# Patient Record
Sex: FEMALE | Race: WHITE | NOT HISPANIC OR LATINO | Employment: OTHER | URBAN - METROPOLITAN AREA
[De-identification: names, ages, dates, MRNs, and addresses within clinical notes are randomized per-mention and may not be internally consistent; named-entity substitution may affect disease eponyms.]

---

## 2022-10-24 ENCOUNTER — TELEPHONE (OUTPATIENT)
Dept: OBSTETRICS AND GYNECOLOGY | Facility: CLINIC | Age: 47
End: 2022-10-24
Payer: COMMERCIAL

## 2022-10-24 DIAGNOSIS — R68.82 DECREASED LIBIDO: ICD-10-CM

## 2022-10-24 DIAGNOSIS — R53.83 FATIGUE, UNSPECIFIED TYPE: Primary | ICD-10-CM

## 2022-10-24 NOTE — TELEPHONE ENCOUNTER
----- Message from Allyn Ann sent at 10/24/2022  9:19 AM CDT -----  Regarding: Pellets  Contact: patient  Per phone call with patient,she stated that she would like to schedule an appointment for Pellets.  Please return call at [phone] (home).    Thanks,  SJ

## 2022-10-24 NOTE — TELEPHONE ENCOUNTER
Called and spoke with the patient in regards to her message. Pt reports doing hormone pellets 1x in [place] about a year ago. She has had a Rt Ooph then hyst with BS. She is experiencing fatigue and decreased libido. I adv patient that Valeria would like for her to have some lab work before ordering the pellets. Will go ahead and schedule insertion for Nov 11th at 3:30. Pt is aware and will go tomorrow to have her labs drawn.   Zara Bustamante

## 2022-10-26 LAB
CHOLEST SERPL-MSCNC: 194 MG/DL (ref 100–200)
FREE TESTOSTERONE: 0.2 NG/DL (ref 0–1)
FSH: 43.7 MIU/ML
HDLC SERPL-MCNC: 65 MG/DL
LDL/HDL RATIO: 1.7 (ref 1–3)
LDLC SERPL CALC-MCNC: 112.6 MG/DL (ref 0–100)
TESTOST SERPL-MCNC: 22.4 NG/DL (ref 8.4–48.1)
TRIGL SERPL-MCNC: 82 MG/DL (ref 0–150)

## 2022-11-15 ENCOUNTER — PROCEDURE VISIT (OUTPATIENT)
Dept: OBSTETRICS AND GYNECOLOGY | Facility: CLINIC | Age: 47
End: 2022-11-15

## 2022-11-15 VITALS
WEIGHT: 172 LBS | DIASTOLIC BLOOD PRESSURE: 83 MMHG | SYSTOLIC BLOOD PRESSURE: 133 MMHG | HEIGHT: 65 IN | BODY MASS INDEX: 28.66 KG/M2 | HEART RATE: 84 BPM

## 2022-11-15 DIAGNOSIS — R53.83 FATIGUE, UNSPECIFIED TYPE: ICD-10-CM

## 2022-11-15 DIAGNOSIS — N95.1 MENOPAUSE SYNDROME: Primary | ICD-10-CM

## 2022-11-15 PROCEDURE — 11980 IMPLANT HORMONE PELLET(S): CPT | Mod: S$GLB,,, | Performed by: NURSE PRACTITIONER

## 2022-11-15 PROCEDURE — 11980 PR IMPLANT,HORMONE,SUBCUTANEOUS: ICD-10-PCS | Mod: S$GLB,,, | Performed by: NURSE PRACTITIONER

## 2022-11-15 NOTE — PROCEDURES
Procedures  Discussed with the patient the risks and benefit of HRT and pellet insertion. Consents signed by the patient and allergies were verified. Time out performed. All questions were answered. The area on the hip was prepped with betadine prep and a sterile drape was applied. 1% lidocaine with epi (5ml) was injected and a small transverse incision was made using an 11 blade. The trocar with cannula was passed through the incision into the subcutaneous tissue and the testosterone and/or estrogen pellets were inserted. The dosing of the pellets are as follows: testosterone 100 mg lot # [id number] exp 12/9/22 and testosterone 25 mg lot # [id number] exp 12/11/22. Bleeding was minimal and a steri strip was applied with a sterile dressing applied on top. The patient tolerated the procedure well and was educated on post insertion instructions.

## 2022-11-15 NOTE — PATIENT INSTRUCTIONS
MONITOR FOR SIGNS AND SYMPTOMS OF INFECTION SUCH AS REDNESS, SWELLING, PUS, INCREASED PAIN OR FEVER. PLEASE CALL THE OFFICE IF YOU EXPERIENCE ANY OF THESE SYMPTOMS. PLEASE REMOVE THE OUTER BANDAGE TOMORROW. PLEASE REMOVE THE STERI STRIP IN THREE DAYS. REFRAIN FROM LOWER BODY EXERCISES FOR THREE DAYS BUT YOU MAY PARTICIPATE IN UPPER BODY EXERCISES OR SLOWER WALKING. IF IT IS TIME FOR BLOOD WORK PLEASE GO IN 6 WEEKS AS ADVISED.     Left side done. Labs ordered, poss add in est if she has s/s

## 2023-01-03 ENCOUNTER — TELEPHONE (OUTPATIENT)
Dept: OBSTETRICS AND GYNECOLOGY | Facility: CLINIC | Age: 48
End: 2023-01-03
Payer: COMMERCIAL

## 2023-01-03 ENCOUNTER — PATIENT MESSAGE (OUTPATIENT)
Dept: OBSTETRICS AND GYNECOLOGY | Facility: CLINIC | Age: 48
End: 2023-01-03
Payer: COMMERCIAL

## 2023-01-03 NOTE — TELEPHONE ENCOUNTER
----- Message from Savita Ryan sent at 1/3/2023  8:53 AM CST -----  Contact: self  Pt called and asked for a call back regarding labs she suppose to have done. Pt can be reached at   [phone]

## 2023-01-04 DIAGNOSIS — Z12.31 SCREENING MAMMOGRAM FOR BREAST CANCER: Primary | ICD-10-CM

## 2023-01-09 ENCOUNTER — PATIENT MESSAGE (OUTPATIENT)
Dept: OBSTETRICS AND GYNECOLOGY | Facility: CLINIC | Age: 48
End: 2023-01-09
Payer: COMMERCIAL

## 2023-01-09 LAB
FREE TESTOSTERONE: 4.09 NG/DL (ref 0–1)
FSH: 4.36 MIU/ML
TESTOST SERPL-MCNC: 297 NG/DL (ref 8.4–48.1)

## 2023-01-18 ENCOUNTER — OFFICE VISIT (OUTPATIENT)
Dept: OBSTETRICS AND GYNECOLOGY | Facility: CLINIC | Age: 48
End: 2023-01-18
Payer: COMMERCIAL

## 2023-01-18 VITALS
HEART RATE: 94 BPM | WEIGHT: 168.19 LBS | DIASTOLIC BLOOD PRESSURE: 80 MMHG | BODY MASS INDEX: 27.99 KG/M2 | SYSTOLIC BLOOD PRESSURE: 116 MMHG

## 2023-01-18 DIAGNOSIS — N62 LARGE BREASTS: ICD-10-CM

## 2023-01-18 DIAGNOSIS — Z01.419 GYNECOLOGIC EXAM NORMAL: Primary | ICD-10-CM

## 2023-01-18 PROCEDURE — 99396 PREV VISIT EST AGE 40-64: CPT | Mod: S$GLB,,, | Performed by: NURSE PRACTITIONER

## 2023-01-18 PROCEDURE — 3079F PR MOST RECENT DIASTOLIC BLOOD PRESSURE 80-89 MM HG: ICD-10-PCS | Mod: CPTII,S$GLB,, | Performed by: NURSE PRACTITIONER

## 2023-01-18 PROCEDURE — 1159F PR MEDICATION LIST DOCUMENTED IN MEDICAL RECORD: ICD-10-PCS | Mod: CPTII,S$GLB,, | Performed by: NURSE PRACTITIONER

## 2023-01-18 PROCEDURE — 3008F BODY MASS INDEX DOCD: CPT | Mod: CPTII,S$GLB,, | Performed by: NURSE PRACTITIONER

## 2023-01-18 PROCEDURE — 3079F DIAST BP 80-89 MM HG: CPT | Mod: CPTII,S$GLB,, | Performed by: NURSE PRACTITIONER

## 2023-01-18 PROCEDURE — 1160F RVW MEDS BY RX/DR IN RCRD: CPT | Mod: CPTII,S$GLB,, | Performed by: NURSE PRACTITIONER

## 2023-01-18 PROCEDURE — 1160F PR REVIEW ALL MEDS BY PRESCRIBER/CLIN PHARMACIST DOCUMENTED: ICD-10-PCS | Mod: CPTII,S$GLB,, | Performed by: NURSE PRACTITIONER

## 2023-01-18 PROCEDURE — 3074F SYST BP LT 130 MM HG: CPT | Mod: CPTII,S$GLB,, | Performed by: NURSE PRACTITIONER

## 2023-01-18 PROCEDURE — 3008F PR BODY MASS INDEX (BMI) DOCUMENTED: ICD-10-PCS | Mod: CPTII,S$GLB,, | Performed by: NURSE PRACTITIONER

## 2023-01-18 PROCEDURE — 1159F MED LIST DOCD IN RCRD: CPT | Mod: CPTII,S$GLB,, | Performed by: NURSE PRACTITIONER

## 2023-01-18 PROCEDURE — 99396 PR PREVENTIVE VISIT,EST,40-64: ICD-10-PCS | Mod: S$GLB,,, | Performed by: NURSE PRACTITIONER

## 2023-01-18 PROCEDURE — 3074F PR MOST RECENT SYSTOLIC BLOOD PRESSURE < 130 MM HG: ICD-10-PCS | Mod: CPTII,S$GLB,, | Performed by: NURSE PRACTITIONER

## 2023-01-18 NOTE — PROGRESS NOTES
Subjective:       Patient ID: Layla Matias is a 47 y.o. female.    Chief Complaint:  Well Woman      History of Present Illness  HPI  Annual Exam-Postmenopausal  Patient presents for annual exam. The patient has no complaints today. The patient is sexually active. GYN screening history: last pap: was normal and last mammogram: was normal. The patient is taking hormone replacement therapy. Patient denies post-menopausal vaginal bleeding. The patient wears seatbelts: yes.     No outpatient medications have been marked as taking for the 23 encounter (Office Visit) with Valeria Fuentes NP.     Vitals:    23 1324   BP: 116/80   Pulse: 94   Weight: 76.3 kg (168 lb 3.2 oz)     History reviewed. No pertinent past medical history.  Past Surgical History:   Procedure Laterality Date     SECTION      x3    RIGHT OOPHORECTOMY      SALPINGECTOMY Bilateral     TOTAL ABDOMINAL HYSTERECTOMY      With BS       GYN & OB History  No LMP recorded. Patient has had a hysterectomy.   Date of Last Pap: No result found    OB History    Para Term  AB Living   3 3           SAB IAB Ectopic Multiple Live Births                  # Outcome Date GA Lbr Surjit/2nd Weight Sex Delivery Anes PTL Lv   3 Para      CS-Unspec      2 Para      CS-Unspec      1 Para      CS-Unspec          Review of Systems  Review of Systems   Constitutional:  Negative for activity change, appetite change, chills, fatigue and fever.   HENT:  Negative for nasal congestion and tinnitus.    Eyes:  Negative for visual disturbance.   Respiratory:  Negative for cough and shortness of breath.    Cardiovascular:  Negative for chest pain and palpitations.   Gastrointestinal:  Negative for abdominal pain, bloating, blood in stool, constipation, nausea and vomiting.   Endocrine: Negative for diabetes, hair loss and hot flashes.   Genitourinary:  Negative for bladder incontinence, decreased libido, dysmenorrhea, dyspareunia, dysuria, flank pain,  frequency, genital sores, hematuria, hot flashes, menorrhagia, menstrual problem, pelvic pain, urgency, vaginal bleeding, vaginal discharge, vaginal pain, urinary incontinence, postcoital bleeding, postmenopausal bleeding, vaginal dryness and vaginal odor.   Musculoskeletal:  Negative for arthralgias, back pain, leg pain and myalgias.   Integumentary:  Negative for rash, acne, hair changes, mole/lesion, breast mass, nipple discharge, breast skin changes and breast tenderness.   Neurological:  Negative for vertigo, syncope, numbness and headaches.   Hematological:  Does not bruise/bleed easily.   Psychiatric/Behavioral:  Negative for depression and sleep disturbance. The patient is not nervous/anxious.    Breast: Negative for asymmetry, lump, mass, mastodynia, nipple discharge, skin changes and tenderness        Objective:    Physical Exam:   Constitutional: Vital signs are normal. She appears well-developed and well-nourished.    HENT:   Head: Normocephalic.   Nose: No epistaxis.    Eyes: Lids are normal.    Neck: Trachea normal.    Cardiovascular:  Normal rate, regular rhythm and normal heart sounds.             Pulmonary/Chest: Effort normal and breath sounds normal. Right breast exhibits no mass, no skin change, no tenderness and no swelling. Left breast exhibits no mass, no skin change, no tenderness and no swelling. Breasts are symmetrical.          Genitourinary:    Vagina, uterus and rectum normal.      Pelvic exam was performed with patient supine.   Labial bartholins normal.Cervix is normal. Right adnexum displays no mass and no tenderness. Left adnexum displays no mass and no tenderness. No erythema or  no vaginal discharge in the vagina.              Lymphadenopathy:     She has no cervical adenopathy.      Psychiatric: She has a normal mood and affect. Her speech is normal and behavior is normal. Judgment and thought content normal.        Assessment:        1. Gynecologic exam normal  Liquid-based pap  smear, screening      2. Large breasts  Ambulatory referral/consult to General Surgery                   Plan:         Gynecologic exam normal  -     Liquid-based pap smear, screening    Large breasts  -     Ambulatory referral/consult to General Surgery; Future; Expected date: 01/25/2023      Patient was counseled today on current ASCCP pap guidelines, the recommendation for yearly pelvic exams, healthy diet and exercise routines, annual mammograms starting at age 40, and breast self awareness. She is to see her PCP for other health maintenance.     Follow up one year or as needed

## 2023-01-23 LAB — Lab: NORMAL

## 2023-01-24 ENCOUNTER — PATIENT MESSAGE (OUTPATIENT)
Dept: OBSTETRICS AND GYNECOLOGY | Facility: CLINIC | Age: 48
End: 2023-01-24
Payer: COMMERCIAL

## 2023-01-25 ENCOUNTER — PATIENT MESSAGE (OUTPATIENT)
Dept: OBSTETRICS AND GYNECOLOGY | Facility: CLINIC | Age: 48
End: 2023-01-25
Payer: COMMERCIAL

## 2023-02-08 ENCOUNTER — OFFICE VISIT (OUTPATIENT)
Dept: PLASTIC SURGERY | Facility: CLINIC | Age: 48
End: 2023-02-08
Payer: COMMERCIAL

## 2023-02-08 VITALS
HEART RATE: 91 BPM | OXYGEN SATURATION: 97 % | HEIGHT: 65 IN | WEIGHT: 162 LBS | SYSTOLIC BLOOD PRESSURE: 144 MMHG | DIASTOLIC BLOOD PRESSURE: 82 MMHG | BODY MASS INDEX: 26.99 KG/M2

## 2023-02-08 DIAGNOSIS — N62 LARGE BREASTS: Primary | ICD-10-CM

## 2023-02-08 PROCEDURE — 1159F MED LIST DOCD IN RCRD: CPT | Mod: CPTII,S$GLB,, | Performed by: SURGERY

## 2023-02-08 PROCEDURE — 3079F PR MOST RECENT DIASTOLIC BLOOD PRESSURE 80-89 MM HG: ICD-10-PCS | Mod: CPTII,S$GLB,, | Performed by: SURGERY

## 2023-02-08 PROCEDURE — 99204 OFFICE O/P NEW MOD 45 MIN: CPT | Mod: S$GLB,,, | Performed by: SURGERY

## 2023-02-08 PROCEDURE — 3077F PR MOST RECENT SYSTOLIC BLOOD PRESSURE >= 140 MM HG: ICD-10-PCS | Mod: CPTII,S$GLB,, | Performed by: SURGERY

## 2023-02-08 PROCEDURE — 3008F BODY MASS INDEX DOCD: CPT | Mod: CPTII,S$GLB,, | Performed by: SURGERY

## 2023-02-08 PROCEDURE — 3008F PR BODY MASS INDEX (BMI) DOCUMENTED: ICD-10-PCS | Mod: CPTII,S$GLB,, | Performed by: SURGERY

## 2023-02-08 PROCEDURE — 3077F SYST BP >= 140 MM HG: CPT | Mod: CPTII,S$GLB,, | Performed by: SURGERY

## 2023-02-08 PROCEDURE — 3079F DIAST BP 80-89 MM HG: CPT | Mod: CPTII,S$GLB,, | Performed by: SURGERY

## 2023-02-08 PROCEDURE — 1159F PR MEDICATION LIST DOCUMENTED IN MEDICAL RECORD: ICD-10-PCS | Mod: CPTII,S$GLB,, | Performed by: SURGERY

## 2023-02-08 PROCEDURE — 99204 PR OFFICE/OUTPT VISIT, NEW, LEVL IV, 45-59 MIN: ICD-10-PCS | Mod: S$GLB,,, | Performed by: SURGERY

## 2023-02-08 RX ORDER — FERROUS SULFATE, DRIED 160(50) MG
1 TABLET, EXTENDED RELEASE ORAL 2 TIMES DAILY WITH MEALS
COMMUNITY

## 2023-02-08 RX ORDER — ERGOCALCIFEROL 1.25 MG/1
CAPSULE ORAL
COMMUNITY
End: 2023-11-28

## 2023-02-08 NOTE — PROGRESS NOTES
"      REFERRAL FOR BREAST REDUCTION    Chief Complaint  Large breasts    Referring provider:  Valeria EGAN  Layla Matias is a 47 y.o. female.  She currently wears a F cup bra and prefers to be smaller.   She has attempted to mitigate symptoms with massagechiropracter done in [place] analgesics, and wearing extra bras.  Her weight has been stable  for 6 months.     She denies breast mass or skin change.   Personal hx of breast biopsy: none  Personalfamily history of breastovarian cancer: mother had ovarian cancer  Mammo uptodate Birads 2 done 2022    She reports associated complaints of:    Restricted physical activity, leaning over to do housework causes pain   Feeling "hunched over" even while sitting  Chronic breast pain secondary to weight  Shoulder grooving     She works as a grocery delivery .    Tobacco use: none  Marijuana use: none      PMH  Past Medical History:   Diagnosis Date    Back pain          PSH  Past Surgical History:   Procedure Laterality Date     SECTION      x3    RIGHT OOPHORECTOMY      SALPINGECTOMY Bilateral     TOTAL ABDOMINAL HYSTERECTOMY      With BS       OBSTETRIC HISTORY  OB History          3    Para   3    Term                AB        Living             SAB        IAB        Ectopic        Multiple        Live Births                     FH  Family History   Problem Relation Age of Onset    Uterine cancer Mother         ukn age    Breast cancer Neg Hx     Colon cancer Neg Hx     Ovarian cancer Neg Hx     Melanoma Neg Hx     Pancreatic cancer Neg Hx     Prostate cancer Neg Hx        MEDICATIONS  Outpatient Medications as of 2023   Medication Sig Dispense Refill    calcium-vitamin D3 (CALCIUM 500 + D) 500 mg-5 mcg (200 unit) per tablet Take 1 tablet by mouth 2 (two) times daily with meals.       No current facility-administered medications on file as of 2023.        ALLERGIES   Review of patient's allergies indicates:   Allergen Reactions    " "Codeine Other (See Comments)     Severe vomiting    Sulfa (sulfonamide antibiotics) Hives       SOCIAL HISTORY  Tobacco:   Social History     Tobacco Use   Smoking Status Never   Smokeless Tobacco Never     EtOH:   Social History     Substance and Sexual Activity   Alcohol Use Yes    Comment: Occasional         ROS  Review of Systems   Constitutional: Negative for chills, fever and malaise/fatigue.   HENT: Negative for congestion.    Eyes: Negative for blurred vision and double vision.   Respiratory: Negative for cough and sputum production.    Cardiovascular: Negative for chest pain and palpitations.   Gastrointestinal: Negative for nausea and vomiting.   Genitourinary: Negative for dysuria and hematuria.   Musculoskeletal: Negative for joint pain.   Skin: Negative for itching and rash.   Neurological: Negative for dizziness, seizures and headaches.   Psychiatric/Behavioral: Negative for depression. The patient is not nervous/anxious.          PHYSICAL EXAM  BP (!) 144/82   Pulse 91   Ht 5' 5" (1.651 m)   Wt 73.5 kg (162 lb)   SpO2 97%   BMI 26.96 kg/m²   Body mass index is 26.96 kg/m².  Estimated body surface area is 1.84 meters squared as calculated from the following:    Height as of this encounter: 5' 5" (1.651 m).    Weight as of this encounter: 73.5 kg (162 lb).    Constitutional: Pt is oriented to person, place, and time.  Pt appears well-developed and well-nourished.   HENT: Normocephalic and atraumatic.   Pulmonary/Chest: Effort normal. No respiratory distress.   Abdomen: Soft. Non-tender. No masses or distension.  Musculoskeletal: Normal range of motion. Pt exhibits no edema or deformity.   Neurological: Pt is alert and oriented to person, place, and time. No sensory deficit. Exhibits normal muscle tone.   Skin: Skin is warm. No rash noted. No erythema.         BREASTS  Asymmetry in size   Ptosis Grade 3  No Masses or skin change appreciated in either breast.  No lymphadenopathy  No active " Excoriation or skin discoloration inframammary fold or midline  Shoulder grooving present  Areola widened, no nipple discharge elicited. Nipple sensation present.  Lipodystrophy: Axillary roll, lateral chest wall  No Breast scars    Measurements  L SN to N 31  R SN to N 33    L N to IMF 10  R N to IMF 12      PHOTOS                        ASSESSMENT  47 y.o. female with Macromastia, symptomatic > 6 months with sufficient evidence of functional impairments related to breast weight.       Today we discussed indications for surgery and I believe she would greatly benefit from a reduction of 482g in each breast at least. We discussed a superior pedicle and keyhole pattern skin reduction to restore nipple position and achieve desired goal. The location of her scars were demonstrated today. We did discuss that her size would not be guaranteed, however we would ensure that her breasts are proportional to her chest frame.      Preoperative, perioperative and postoperative plans were discussed in detail. Risks of surgery and alternatives to surgery were also discussed today.      She is >35 years of age with no known family history of breast cancer or elevated personal risk. Mammogram report.    She understands that any breast tissue removed during surgery will be weighed and sent to pathology for a thorough evaluation. She understands that this is not a cancer operation and that if atypicalpremalignantor malignant cells are found, further surgery may be warranted after the appropriate consultations with surgical oncology are completed.     She has considered these options and she would like to proceed with breast reduction.   We will provide add on professional fees for her desired fat grafting to shoulder grooving, liposuction of axillary and chest wall lipodystrophy.   We took photos today to obtain pre-authorization for this clinically indicated procedure.  All of her questions were answered today to her satisfaction.      Pt does do pellet replacement last dose 11/2022- rec cessation     Madelyn Son MD FACS

## 2023-03-24 ENCOUNTER — TELEPHONE (OUTPATIENT)
Dept: PLASTIC SURGERY | Facility: CLINIC | Age: 48
End: 2023-03-24
Payer: COMMERCIAL

## 2023-03-24 NOTE — TELEPHONE ENCOUNTER
----- Message from Vianca Kellogg sent at 3/24/2023  3:28 PM CDT -----  Contact: Pt    ----- Message -----  From: Hannah Simeon  Sent: 3/24/2023   1:31 PM CDT  To: [facility] Staff    Calling rg wanting to move her surgery up from the date that she is scheduled and can be reached at [phone]//thanks/dbw

## 2023-03-24 NOTE — TELEPHONE ENCOUNTER
Spoke with patient she will keep her original date. If April 19th opens she would like to be notified. She has not had any pellets since November

## 2023-04-10 ENCOUNTER — CLINICAL SUPPORT (OUTPATIENT)
Dept: PLASTIC SURGERY | Facility: CLINIC | Age: 48
End: 2023-04-10
Payer: COMMERCIAL

## 2023-04-10 DIAGNOSIS — N62 LARGE BREASTS: Primary | ICD-10-CM

## 2023-04-17 ENCOUNTER — OFFICE VISIT (OUTPATIENT)
Dept: PLASTIC SURGERY | Facility: CLINIC | Age: 48
End: 2023-04-17
Payer: COMMERCIAL

## 2023-04-17 VITALS
SYSTOLIC BLOOD PRESSURE: 135 MMHG | OXYGEN SATURATION: 99 % | HEART RATE: 79 BPM | HEIGHT: 67 IN | BODY MASS INDEX: 25.43 KG/M2 | WEIGHT: 162 LBS | DIASTOLIC BLOOD PRESSURE: 74 MMHG

## 2023-04-17 DIAGNOSIS — N62 LARGE BREASTS: Primary | ICD-10-CM

## 2023-04-17 PROCEDURE — 3075F SYST BP GE 130 - 139MM HG: CPT | Mod: CPTII,S$GLB,, | Performed by: SURGERY

## 2023-04-17 PROCEDURE — 99214 PR OFFICE/OUTPT VISIT, EST, LEVL IV, 30-39 MIN: ICD-10-PCS | Mod: 57,S$GLB,, | Performed by: SURGERY

## 2023-04-17 PROCEDURE — 3008F PR BODY MASS INDEX (BMI) DOCUMENTED: ICD-10-PCS | Mod: CPTII,S$GLB,, | Performed by: SURGERY

## 2023-04-17 PROCEDURE — 3075F PR MOST RECENT SYSTOLIC BLOOD PRESS GE 130-139MM HG: ICD-10-PCS | Mod: CPTII,S$GLB,, | Performed by: SURGERY

## 2023-04-17 PROCEDURE — 1159F MED LIST DOCD IN RCRD: CPT | Mod: CPTII,S$GLB,, | Performed by: SURGERY

## 2023-04-17 PROCEDURE — 1159F PR MEDICATION LIST DOCUMENTED IN MEDICAL RECORD: ICD-10-PCS | Mod: CPTII,S$GLB,, | Performed by: SURGERY

## 2023-04-17 PROCEDURE — 3078F DIAST BP <80 MM HG: CPT | Mod: CPTII,S$GLB,, | Performed by: SURGERY

## 2023-04-17 PROCEDURE — 3008F BODY MASS INDEX DOCD: CPT | Mod: CPTII,S$GLB,, | Performed by: SURGERY

## 2023-04-17 PROCEDURE — 3078F PR MOST RECENT DIASTOLIC BLOOD PRESSURE < 80 MM HG: ICD-10-PCS | Mod: CPTII,S$GLB,, | Performed by: SURGERY

## 2023-04-17 PROCEDURE — 99214 OFFICE O/P EST MOD 30 MIN: CPT | Mod: 57,S$GLB,, | Performed by: SURGERY

## 2023-04-17 RX ORDER — CEPHALEXIN 500 MG/1
500 CAPSULE ORAL EVERY 6 HOURS
Qty: 28 CAPSULE | Refills: 0 | Status: SHIPPED | OUTPATIENT
Start: 2023-04-17 | End: 2023-04-24

## 2023-04-17 RX ORDER — ONDANSETRON 4 MG/1
4 TABLET, FILM COATED ORAL EVERY 6 HOURS PRN
Qty: 20 TABLET | Refills: 0 | Status: SHIPPED | OUTPATIENT
Start: 2023-04-17 | End: 2023-04-22

## 2023-04-17 RX ORDER — OXYCODONE AND ACETAMINOPHEN 5; 325 MG/1; MG/1
TABLET ORAL
Qty: 20 TABLET | Refills: 0 | Status: SHIPPED | OUTPATIENT
Start: 2023-04-17 | End: 2023-11-28

## 2023-04-17 RX ORDER — KETOROLAC TROMETHAMINE 10 MG/1
10 TABLET, FILM COATED ORAL EVERY 6 HOURS
Qty: 20 TABLET | Refills: 0 | Status: SHIPPED | OUTPATIENT
Start: 2023-04-17 | End: 2023-04-22

## 2023-04-17 NOTE — PROGRESS NOTES
"      REFERRAL FOR BREAST REDUCTION    Chief Complaint  Large breasts    Referring provider:  Valeria Harrison    HPI  Layla Matias is a 47 y.o. female.  She currently wears a F cup bra and prefers to be smaller.   She has attempted to mitigate symptoms with massagechiropracter done in [place] analgesics, and wearing extra bras.  Her weight has been stable  for 6 months.     She denies breast mass or skin change.   Personal hx of breast biopsy: none  Personalfamily history of breastovarian cancer: mother had ovarian cancer  Mammo uptodate Birads 2 done 2023 see imaging tab    She reports associated complaints of:    Restricted physical activity, leaning over to do housework causes pain   Feeling "hunched over" even while sitting  Chronic breast pain secondary to weight  Shoulder grooving     She works as a grocery delivery .    Tobacco use: none  Marijuana use: none      PMH  Past Medical History:   Diagnosis Date    Back pain          PSH  Past Surgical History:   Procedure Laterality Date     SECTION      x3    RIGHT OOPHORECTOMY      SALPINGECTOMY Bilateral     TOTAL ABDOMINAL HYSTERECTOMY      With BS       OBSTETRIC HISTORY  OB History          3    Para   3    Term                AB        Living             SAB        IAB        Ectopic        Multiple        Live Births                     FH  Family History   Problem Relation Age of Onset    Uterine cancer Mother         ukn age    Breast cancer Neg Hx     Colon cancer Neg Hx     Ovarian cancer Neg Hx     Melanoma Neg Hx     Pancreatic cancer Neg Hx     Prostate cancer Neg Hx        MEDICATIONS  Outpatient Medications as of 2023   Medication Sig Dispense Refill    calcium-vitamin D3 (OS-RICHELLE 500 + D3) 500 mg-5 mcg (200 unit) per tablet Take 1 tablet by mouth 2 (two) times daily with meals.      ergocalciferol (ERGOCALCIFEROL) 50,000 unit Cap Take by mouth every 7 days.      multivitamin capsule Take 1 capsule by mouth once " "daily.       No current facility-administered medications on file as of 4/17/2023.        ALLERGIES   Review of patient's allergies indicates:   Allergen Reactions    Codeine Other (See Comments)     Severe vomiting    Sulfa (sulfonamide antibiotics) Hives       SOCIAL HISTORY  Tobacco:   Social History     Tobacco Use   Smoking Status Never   Smokeless Tobacco Never     EtOH:   Social History     Substance and Sexual Activity   Alcohol Use Yes    Comment: Occasional         ROS  Review of Systems   Constitutional: Negative for chills, fever and malaise/fatigue.   HENT: Negative for congestion.    Eyes: Negative for blurred vision and double vision.   Respiratory: Negative for cough and sputum production.    Cardiovascular: Negative for chest pain and palpitations.   Gastrointestinal: Negative for nausea and vomiting.   Genitourinary: Negative for dysuria and hematuria.   Musculoskeletal: Negative for joint pain.   Skin: Negative for itching and rash.   Neurological: Negative for dizziness, seizures and headaches.   Psychiatric/Behavioral: Negative for depression. The patient is not nervous/anxious.          PHYSICAL EXAM  /74   Pulse 79   Ht 5' 7" (1.702 m)   Wt 73.5 kg (162 lb)   SpO2 99%   BMI 25.37 kg/m²   Body mass index is 25.37 kg/m².  Estimated body surface area is 1.86 meters squared as calculated from the following:    Height as of this encounter: 5' 7" (1.702 m).    Weight as of this encounter: 73.5 kg (162 lb).    Constitutional: Pt is oriented to person, place, and time.  Pt appears well-developed and well-nourished.   HENT: Normocephalic and atraumatic.   Pulmonary/Chest: Effort normal. No respiratory distress.   Abdomen: Soft. Non-tender. No masses or distension.  Musculoskeletal: Normal range of motion. Pt exhibits no edema or deformity.   Neurological: Pt is alert and oriented to person, place, and time. No sensory deficit. Exhibits normal muscle tone.   Skin: Skin is warm. No rash noted. " No erythema.         BREASTS  Asymmetry in size   Ptosis Grade 3  No Masses or skin change appreciated in either breast.  No lymphadenopathy  No active Excoriation or skin discoloration inframammary fold or midline  Shoulder grooving present  Areola widened, no nipple discharge elicited. Nipple sensation present.  Lipodystrophy: Axillary roll, lateral chest wall  No Breast scars    Measurements  L SN to N 31  R SN to N 33    L N to IMF 10  R N to IMF 12      PHOTOS                        ASSESSMENT  47 y.o. female with Macromastia, symptomatic > 6 months with sufficient evidence of functional impairments related to breast weight.       Today we discussed indications for surgery and I believe she would greatly benefit from a reduction of 482g in each breast at least. We discussed a superior pedicle and keyhole pattern skin reduction to restore nipple position and achieve desired goal. The location of her scars were demonstrated today. We did discuss that her size would not be guaranteed, however we would ensure that her breasts are proportional to her chest frame.      Preoperative, perioperative and postoperative plans were discussed in detail. Risks of surgery and alternatives to surgery were also discussed today.      She is >35 years of age with no known family history of breast cancer or elevated personal risk. Mammogram 48909 Birad 2    She understands that any breast tissue removed during surgery will be weighed and sent to pathology for a thorough evaluation. She understands that this is not a cancer operation and that if atypicalpremalignantor malignant cells are found, further surgery may be warranted after the appropriate consultations with surgical oncology are completed.     She has considered these options and she would like to proceed with breast reduction.   We will provide add on professional fees for her desired fat grafting to shoulder grooving, liposuction of axillary and chest wall  lipodystrophy.   We took photos today to obtain pre-authorization for this clinically indicated procedure.  All of her questions were answered today to her satisfaction.     Pt does do pellet replacement last dose 11/2022- rec cessation   Consents for surgery obtained, surgery scheduled 4/25/23 bilateral breast reduction  Mammo done and up to date benign       INFORMED CONSENT  The procedure was fully discussed with the patient. Outpatient or hospital ambulatory surgery disposition under general anesthesia were explained. She is a candidate for a keyhole superior pedicle; the location of her scars was demonstrated. Her idealized bra size after surgery was solicited for surgical planning, although I explained her bra size after surgery could not be guaranteed.    Procedure: Bilateral breast reduction  Nonsurgical and surgical treatment options were reviewed.  Possible risks of breast reduction include but are not limited to:  -bleeding  -infection  -heavy and prolonged or permanent scarring, possible keloid formation  -breast lumps, hardness  -breasts different size, shape  -loss of nipple sensation  -hypersensitivity of nipple-areolar complex  -wound separation or delayed wound healing  -venous thromboembolism  -complications from anesthesia  -difficulty with future mammograms  -emotional problems or negative impact on relationships from altered breast size  -desired breast size not attained: breasts too small or too large  -difficult bra fitting  -poor cosmetic outcome  -puckering of incisions  -irregular shape, nipple location  -need for further surgery  -inability to breast feed      PLAN     Disposition: outpatient or hospital ambulatory surgery  General anesthesia.  Labs: CBC, Electrolytes, pregnancy test cxr ekg  Antibiotic prophylaxis: Cefazolin 2  GM preop  Caprini risk (3) Antiembolic prophylaxis with ALICE and sequential pneumatic devices.              Madelyn Son MD FACS

## 2023-04-21 LAB
ANION GAP SERPL CALC-SCNC: 5 MMOL/L (ref 3–11)
BASOPHILS NFR BLD: 0.7 % (ref 0–3)
BUN SERPL-MCNC: 19 MG/DL (ref 7–18)
BUN/CREAT SERPL: 26.76 RATIO (ref 7–18)
CALCIUM SERPL-MCNC: 8.9 MG/DL (ref 8.8–10.5)
CHLORIDE SERPL-SCNC: 101 MMOL/L (ref 100–108)
CO2 SERPL-SCNC: 30 MMOL/L (ref 21–32)
COTININE, URINE: NORMAL
CREAT SERPL-MCNC: 0.71 MG/DL (ref 0.55–1.02)
EOSINOPHIL NFR BLD: 4.3 % (ref 1–3)
ERYTHROCYTE [DISTWIDTH] IN BLOOD BY AUTOMATED COUNT: 12.4 % (ref 12.5–18)
GFR ESTIMATION: > 60
GLUCOSE SERPL-MCNC: 88 MG/DL (ref 70–110)
HCT VFR BLD AUTO: 42.9 % (ref 37–47)
HGB BLD-MCNC: 14.5 G/DL (ref 12–16)
LYMPHOCYTES NFR BLD: 23 % (ref 25–40)
MCH RBC QN AUTO: 29 PG (ref 27–31.2)
MCHC RBC AUTO-ENTMCNC: 33.8 G/DL (ref 31.8–35.4)
MCV RBC AUTO: 85.8 FL (ref 80–97)
MONOCYTES NFR BLD: 8.1 % (ref 1–15)
NEUTROPHILS # BLD AUTO: 4.57 10*3/UL (ref 1.8–7.7)
NEUTROPHILS NFR BLD: 63.6 % (ref 37–80)
NUCLEATED RED BLOOD CELLS: 0 %
PLATELETS: 252 10*3/UL (ref 142–424)
POTASSIUM SERPL-SCNC: 4.2 MMOL/L (ref 3.6–5.2)
RBC # BLD AUTO: 5 10*6/UL (ref 4.2–5.4)
SODIUM BLD-SCNC: 136 MMOL/L (ref 135–145)
WBC # BLD: 7.2 10*3/UL (ref 4.6–10.2)

## 2023-04-25 ENCOUNTER — OUTSIDE PLACE OF SERVICE (OUTPATIENT)
Dept: PLASTIC SURGERY | Facility: CLINIC | Age: 48
End: 2023-04-25

## 2023-04-25 PROCEDURE — 19318 PR REDUCTION OF LARGE BREAST: ICD-10-PCS | Mod: 50,,, | Performed by: SURGERY

## 2023-04-25 PROCEDURE — 19318 BREAST REDUCTION: CPT | Mod: 50,,, | Performed by: SURGERY

## 2023-04-26 ENCOUNTER — TELEPHONE (OUTPATIENT)
Dept: PLASTIC SURGERY | Facility: CLINIC | Age: 48
End: 2023-04-26
Payer: COMMERCIAL

## 2023-04-26 LAB — SPECIMEN TO PATHOLOGY: NORMAL

## 2023-04-26 NOTE — TELEPHONE ENCOUNTER
----- Message from Elvie Paiz sent at 4/26/2023  8:06 AM CDT -----  Patient   Franki is calling in regards drain.  Please call back at [phone]     I have personally performed a face to face diagnostic evaluation on this patient. I have reviewed the ACP note and agree with the history, exam and plan of care, except as noted.

## 2023-04-28 ENCOUNTER — CLINICAL SUPPORT (OUTPATIENT)
Dept: PLASTIC SURGERY | Facility: CLINIC | Age: 48
End: 2023-04-28
Payer: COMMERCIAL

## 2023-04-28 VITALS
DIASTOLIC BLOOD PRESSURE: 81 MMHG | HEART RATE: 77 BPM | OXYGEN SATURATION: 96 % | RESPIRATION RATE: 14 BRPM | SYSTOLIC BLOOD PRESSURE: 131 MMHG

## 2023-04-28 DIAGNOSIS — Z98.890 STATUS POST BREAST REDUCTION: Primary | ICD-10-CM

## 2023-04-28 NOTE — PROGRESS NOTES
POST-OPERATIVE EXAM    CC  Post op    PROCEDURE  Bilateral breast reduction-4/25/23    SUBJECTIVE  Preoperative symptoms have improved.  No pain uncontrolled.    Denies fevers, drainage, or wound concerns.     PHYSICAL EXAM  /81   Pulse 77   Resp 14   SpO2 96%   Breast symmetry and shape good, soft no fat necrosis  moderate bruising   Healing primarily  No masses  Nipples sensate decreased   NAC healthy, viable- good capillary refill   Surgical site  Incisions clean dry and intact  No seroma or hematoma    MANDIE drain on right side with output less than 10 in 24 hour period-MANDIE removed, baci and mepilex applied over incision  MANDIE drain to left side with output 30cc in 24 hour period. MANDIE remains until Monday           ASSESSMENT  Encounter Diagnoses   Name Primary?    Status post breast reduction Yes     No signs of complications.  Patient is doing well after surgery.   Allow time for contour and scar maturation.    PLAN  F/u Monday      WOUND CARE INSTRUCTIONS  BATHING  You may shower normally. No soaking tub bath or swimming pool until cleared by Dr. Son.    WOUND CARE  You may leave the dressings off  Reinforce incisions with adaptec/dry gauze until completely healed    SUTURES  Sutures and tape remain in place     ACTIVITY  You may resume moderate exercise (walking, incline walking, stationary bike)   You may progress to full exercise after 2 weeks.  Avoid heavy lifting, running, swimming, strenuous activity for 2 weeks.    MEDICATIONS  Continue your usual medications and vitamins    SCAR MANAGEMENT  Scars may take over 1 year to mature.  Some scars will remain pink, dark purple, and possibly raised for 6-9 months after surgery.  After one year, scars often become flatter, smoother, and may change color.  After removal of the tape, suture removal, or when glue was used, apply a thin layer of Aquaphor (available at any drugstore) or antibiotic ointment to the scars for another 2 weeks.  Begin silicone when  scars smooth, generally starting about 2 weeks after surgery.  Medical grade silicone gel is available on companies' web sites or on amazon.com  Brands recommended:  - Biocorneum  - Skin medica Scar Recovery Gel Skin Medica  Massage the scar twice daily for about 30 seconds

## 2023-05-01 ENCOUNTER — OFFICE VISIT (OUTPATIENT)
Dept: PLASTIC SURGERY | Facility: CLINIC | Age: 48
End: 2023-05-01
Payer: COMMERCIAL

## 2023-05-01 VITALS
HEART RATE: 75 BPM | SYSTOLIC BLOOD PRESSURE: 128 MMHG | WEIGHT: 162 LBS | DIASTOLIC BLOOD PRESSURE: 75 MMHG | OXYGEN SATURATION: 98 % | RESPIRATION RATE: 14 BRPM | BODY MASS INDEX: 25.37 KG/M2

## 2023-05-01 DIAGNOSIS — Z98.890 STATUS POST BREAST REDUCTION: Primary | ICD-10-CM

## 2023-05-01 PROCEDURE — 3078F DIAST BP <80 MM HG: CPT | Mod: CPTII,S$GLB,, | Performed by: SURGERY

## 2023-05-01 PROCEDURE — 3074F SYST BP LT 130 MM HG: CPT | Mod: CPTII,S$GLB,, | Performed by: SURGERY

## 2023-05-01 PROCEDURE — 99024 PR POST-OP FOLLOW-UP VISIT: ICD-10-PCS | Mod: S$GLB,,, | Performed by: SURGERY

## 2023-05-01 PROCEDURE — 3008F PR BODY MASS INDEX (BMI) DOCUMENTED: ICD-10-PCS | Mod: CPTII,S$GLB,, | Performed by: SURGERY

## 2023-05-01 PROCEDURE — 99024 POSTOP FOLLOW-UP VISIT: CPT | Mod: S$GLB,,, | Performed by: SURGERY

## 2023-05-01 PROCEDURE — 3078F PR MOST RECENT DIASTOLIC BLOOD PRESSURE < 80 MM HG: ICD-10-PCS | Mod: CPTII,S$GLB,, | Performed by: SURGERY

## 2023-05-01 PROCEDURE — 3008F BODY MASS INDEX DOCD: CPT | Mod: CPTII,S$GLB,, | Performed by: SURGERY

## 2023-05-01 PROCEDURE — 3074F PR MOST RECENT SYSTOLIC BLOOD PRESSURE < 130 MM HG: ICD-10-PCS | Mod: CPTII,S$GLB,, | Performed by: SURGERY

## 2023-05-01 NOTE — PROGRESS NOTES
POST-OPERATIVE EXAM    CC  Post op    PROCEDURE  Breast reduction 4/25/23    SUBJECTIVE  Preoperative symptoms have improved.  No pain uncontrolled.    Denies fevers, drainage, or wound concerns.     PHYSICAL EXAM  /75   Pulse 75   Resp 14   Wt 73.5 kg (162 lb)   SpO2 98%   BMI 25.37 kg/m²   Breast symmetry and shape good, soft no fat necrosis  Minor bruising   Healing primarily  Scars pink, without hypertrophy  No masses  Nipples sensate  NAC healthy, viable    Surgical site  Incisions clean dry and intact  No seroma or hematoma        PATHOLOGY  benign    ASSESSMENT  Sp BBR  No signs of complications.  Patient is doing well after surgery.   Allow time for contour and scar maturation.    PLAN  F/u in 1 weeks  Drain removed . Applied baci and covered with band aide.  No heavy lifting.        WOUND CARE INSTRUCTIONS  BATHING  You may shower normally. No soaking tub bath or swimming pool until cleared by Dr. Son.          ACTIVITY  You may resume moderate exercise (walking, incline walking, stationary bike)   You may progress to full exercise after 2 weeks.  Avoid heavy lifting, running, swimming, strenuous activity for 2 weeks.    MEDICATIONS  Continue your usual medications and vitamins    SCAR MANAGEMENT  Scars may take over 1 year to mature.  Some scars will remain pink, dark purple, and possibly raised for 6-9 months after surgery.  After one year, scars often become flatter, smoother, and may change color.  After removal of the tape, suture removal, or when glue was used, apply a thin layer of Aquaphor (available at any drugstore) or antibiotic ointment to the scars for another 2 weeks.  Begin silicone when scars smooth, generally starting about 2 weeks after surgery.  Medical grade silicone gel is available on companies' web sites or on amazon.com  Brands recommended:  - Biocorneum  - Skin medica Scar Recovery Gel Skin Medica  Massage the scar twice daily for about 30 seconds

## 2023-05-08 ENCOUNTER — OFFICE VISIT (OUTPATIENT)
Dept: PLASTIC SURGERY | Facility: CLINIC | Age: 48
End: 2023-05-08
Payer: COMMERCIAL

## 2023-05-08 VITALS
BODY MASS INDEX: 25.43 KG/M2 | WEIGHT: 162 LBS | HEIGHT: 67 IN | DIASTOLIC BLOOD PRESSURE: 72 MMHG | OXYGEN SATURATION: 96 % | SYSTOLIC BLOOD PRESSURE: 105 MMHG | HEART RATE: 83 BPM

## 2023-05-08 DIAGNOSIS — Z98.890 STATUS POST BREAST REDUCTION: Primary | ICD-10-CM

## 2023-05-08 PROCEDURE — 3078F DIAST BP <80 MM HG: CPT | Mod: CPTII,S$GLB,, | Performed by: SURGERY

## 2023-05-08 PROCEDURE — 3008F PR BODY MASS INDEX (BMI) DOCUMENTED: ICD-10-PCS | Mod: CPTII,S$GLB,, | Performed by: SURGERY

## 2023-05-08 PROCEDURE — 3074F PR MOST RECENT SYSTOLIC BLOOD PRESSURE < 130 MM HG: ICD-10-PCS | Mod: CPTII,S$GLB,, | Performed by: SURGERY

## 2023-05-08 PROCEDURE — 3078F PR MOST RECENT DIASTOLIC BLOOD PRESSURE < 80 MM HG: ICD-10-PCS | Mod: CPTII,S$GLB,, | Performed by: SURGERY

## 2023-05-08 PROCEDURE — 3074F SYST BP LT 130 MM HG: CPT | Mod: CPTII,S$GLB,, | Performed by: SURGERY

## 2023-05-08 PROCEDURE — 3008F BODY MASS INDEX DOCD: CPT | Mod: CPTII,S$GLB,, | Performed by: SURGERY

## 2023-05-08 PROCEDURE — 99024 POSTOP FOLLOW-UP VISIT: CPT | Mod: S$GLB,,, | Performed by: SURGERY

## 2023-05-08 PROCEDURE — 99024 PR POST-OP FOLLOW-UP VISIT: ICD-10-PCS | Mod: S$GLB,,, | Performed by: SURGERY

## 2023-05-08 NOTE — PROGRESS NOTES
"POST-OPERATIVE EXAM    CC  Post op- 3 weeks     PROCEDURE  Breast reduction 4/25/23    SUBJECTIVE  Preoperative symptoms have improved.  No pain uncontrolled.    Denies fevers, drainage, or wound concerns.     PHYSICAL EXAM  /72   Pulse 83   Ht 5' 7" (1.702 m)   Wt 73.5 kg (162 lb)   SpO2 96%   BMI 25.37 kg/m²   Breast symmetry and shape good, soft no fat necrosis  Minor bruising   Healing primarily  Scars pink, without hypertrophy  No masses  Nipples sensate  NAC healthy, viable    Surgical site  Incisions clean dry and intact  No seroma or hematoma        PATHOLOGY  benign    ASSESSMENT  Sp BBR  No signs of complications.  Patient is doing well after surgery.   Allow time for contour and scar maturation.    PLAN  F/u in 2weeks  Tape and  sutures removed, cleaned with soap and Vashe  Aquaphor or bacitracin to incisions  Start Biocornium in 2 weeks        WOUND CARE INSTRUCTIONS  BATHING  You may shower normally. No soaking tub bath or swimming pool until cleared by Dr. Son.          ACTIVITY  You may resume moderate exercise (walking, incline walking, stationary bike)   You may progress to full exercise after 2 weeks.  Avoid heavy lifting, running, swimming, strenuous activity for 2 weeks.    MEDICATIONS  Continue your usual medications and vitamins    SCAR MANAGEMENT  Scars may take over 1 year to mature.  Some scars will remain pink, dark purple, and possibly raised for 6-9 months after surgery.  After one year, scars often become flatter, smoother, and may change color.  After removal of the tape, suture removal, or when glue was used, apply a thin layer of Aquaphor (available at any drugstore) or antibiotic ointment to the scars for another 2 weeks.  Begin silicone when scars smooth, generally starting about 2 weeks after surgery.  Medical grade silicone gel is available on companies' web sites or on amazon.com  Brands recommended:  - Biocorneum  - Skin medica Scar Recovery Gel Skin " Medica  Massage the scar twice daily for about 30 seconds

## 2023-05-16 ENCOUNTER — TELEPHONE (OUTPATIENT)
Dept: PLASTIC SURGERY | Facility: CLINIC | Age: 48
End: 2023-05-16
Payer: COMMERCIAL

## 2023-05-16 NOTE — TELEPHONE ENCOUNTER
----- Message from Barbi Disla LPN sent at 5/15/2023  6:03 PM CDT -----    ----- Message -----  From: Nikki Gale  Sent: 5/15/2023   2:28 PM CDT  To: [facility] Staff    Type:  Needs Medical Advice    Who Called: Layla Tripp    Symptoms (please be specific): -   How long has patient had these symptoms:  -  Pharmacy name and phone #:  -  Would the patient rather a call back or a response via [facility]?    Best Call Back Number: [phone]    Additional Information:  needs to speak w/ nurse about when to start the scar cream

## 2023-05-22 ENCOUNTER — TELEPHONE (OUTPATIENT)
Dept: PLASTIC SURGERY | Facility: CLINIC | Age: 48
End: 2023-05-22

## 2023-05-22 ENCOUNTER — OFFICE VISIT (OUTPATIENT)
Dept: PLASTIC SURGERY | Facility: CLINIC | Age: 48
End: 2023-05-22
Payer: COMMERCIAL

## 2023-05-22 DIAGNOSIS — Z98.890 STATUS POST BREAST REDUCTION: Primary | ICD-10-CM

## 2023-05-22 PROCEDURE — 99024 POSTOP FOLLOW-UP VISIT: CPT | Mod: S$GLB,,, | Performed by: SURGERY

## 2023-05-22 PROCEDURE — 99024 PR POST-OP FOLLOW-UP VISIT: ICD-10-PCS | Mod: S$GLB,,, | Performed by: SURGERY

## 2023-05-22 NOTE — PROGRESS NOTES
POST-OPERATIVE EXAM    CC  Post op- 4 weeks tomorrow     PROCEDURE  Breast reduction 4/25/23    SUBJECTIVE  Preoperative symptoms have improved.  Right sided breast pain, located along side of breast, denies any redness, very tender, low grade temp this morning 99.3  Denies drainage, or wound concerns.     PHYSICAL EXAM  Breast symmetry and shape good, soft no fat necrosis  Healing primarily  Scars pink, without hypertrophy  No masses  Nipples sensate  NAC healthy, viable    Surgical site  Incisions clean dry and intact  No seroma or hematoma        PATHOLOGY  benign    ASSESSMENT  Sp BBR  No signs of complications.  Patient is doing well after surgery.   Allow time for contour and scar maturation.    PLAN  Start scar cream BID   Return in 2 months   Cleared from all restrictions       SCAR MANAGEMENT  Scars may take over 1 year to mature.  Some scars will remain pink, dark purple, and possibly raised for 6-9 months after surgery.  After one year, scars often become flatter, smoother, and may change color.  After removal of the tape, suture removal, or when glue was used, apply a thin layer of Aquaphor (available at any drugstore) or antibiotic ointment to the scars for another 2 weeks.  Begin silicone when scars smooth, generally starting about 2 weeks after surgery.  Medical grade silicone gel is available on companies' web sites or on amazon.com  Brands recommended:  - Biocorneum  - Skin medica Scar Recovery Gel Skin Medica  Massage the scar twice daily for about 30 seconds

## 2023-05-22 NOTE — TELEPHONE ENCOUNTER
----- Message from Jessica Keene sent at 5/22/2023  8:44 AM CDT -----  Contact: self  Type:  Needs Medical Advice    Who Called: Layla Matias   Symptoms (please be specific):  right breast hurts    How long has patient had these symptoms:  few days   Pharmacy name and phone #:    [id number] - [facility] [address] [facility] [address] [facility]  [address] [place] [id number]  Phone: [phone] Fax: [phone]  Would the patient rather a call back or a response via [facility]? Call back   Best Call Back Number: [phone]   Additional Information: no answer at the clinic - thanks!

## 2023-07-10 ENCOUNTER — OFFICE VISIT (OUTPATIENT)
Dept: PLASTIC SURGERY | Facility: CLINIC | Age: 48
End: 2023-07-10
Payer: COMMERCIAL

## 2023-07-10 VITALS — WEIGHT: 168 LBS | BODY MASS INDEX: 26.37 KG/M2 | HEIGHT: 67 IN

## 2023-07-10 DIAGNOSIS — Z98.890 STATUS POST BREAST REDUCTION: Primary | ICD-10-CM

## 2023-07-10 PROCEDURE — 1159F PR MEDICATION LIST DOCUMENTED IN MEDICAL RECORD: ICD-10-PCS | Mod: CPTII,S$GLB,, | Performed by: SURGERY

## 2023-07-10 PROCEDURE — 1159F MED LIST DOCD IN RCRD: CPT | Mod: CPTII,S$GLB,, | Performed by: SURGERY

## 2023-07-10 PROCEDURE — 99024 PR POST-OP FOLLOW-UP VISIT: ICD-10-PCS | Mod: S$GLB,,, | Performed by: SURGERY

## 2023-07-10 PROCEDURE — 3008F PR BODY MASS INDEX (BMI) DOCUMENTED: ICD-10-PCS | Mod: CPTII,S$GLB,, | Performed by: SURGERY

## 2023-07-10 PROCEDURE — 3008F BODY MASS INDEX DOCD: CPT | Mod: CPTII,S$GLB,, | Performed by: SURGERY

## 2023-07-10 PROCEDURE — 99024 POSTOP FOLLOW-UP VISIT: CPT | Mod: S$GLB,,, | Performed by: SURGERY

## 2023-07-10 NOTE — Clinical Note
For someone like this---her last note says follow up in 2 mo and whomever scheduled her did so  within 2 mo therefore I cant bill for this. This is a waste of time so we need to pay attention to that recommended follow up appt.

## 2023-07-10 NOTE — PROGRESS NOTES
POST-OPERATIVE EXAM    CC  Post op- 10.5 weeks    PROCEDURE  Breast reduction 4/25/23    SUBJECTIVE  Preoperative symptoms have improved.  Denies drainage, or wound concerns.   Doing well, no problems. Back and shoulders feeling better       PHYSICAL EXAM  Breast symmetry and shape good, soft no fat necrosis  Healing primarily  Scars pink, without hypertrophy  No masses  Nipples sensate  NAC healthy, viable    Surgical site  Incisions clean dry and intact  No seroma or hematoma    PATHOLOGY  benign    ASSESSMENT  Sp BBR  No signs of complications.  Patient is doing well after surgery.   Allow time for contour and scar maturation.    PLAN  Return in 6 months   Cleared from all restrictions   Photos taken today    SCAR MANAGEMENT  Scars may take over 1 year to mature.  Some scars will remain pink, dark purple, and possibly raised for 6-9 months after surgery.  After one year, scars often become flatter, smoother, and may change color.  After removal of the tape, suture removal, or when glue was used, apply a thin layer of Aquaphor (available at any drugstore) or antibiotic ointment to the scars for another 2 weeks.  Begin silicone when scars smooth, generally starting about 2 weeks after surgery.  Medical grade silicone gel is available on companies' web sites or on amazon.com  Brands recommended:  - Biocorneum  - Skin medica Scar Recovery Gel Skin Medica  Massage the scar twice daily for about 30 seconds

## 2023-08-30 ENCOUNTER — TELEPHONE (OUTPATIENT)
Dept: PLASTIC SURGERY | Facility: CLINIC | Age: 48
End: 2023-08-30
Payer: COMMERCIAL

## 2023-11-28 ENCOUNTER — TELEPHONE (OUTPATIENT)
Dept: OBSTETRICS AND GYNECOLOGY | Facility: CLINIC | Age: 48
End: 2023-11-28

## 2023-11-28 ENCOUNTER — OFFICE VISIT (OUTPATIENT)
Dept: OBSTETRICS AND GYNECOLOGY | Facility: CLINIC | Age: 48
End: 2023-11-28
Payer: COMMERCIAL

## 2023-11-28 VITALS
WEIGHT: 172 LBS | SYSTOLIC BLOOD PRESSURE: 109 MMHG | DIASTOLIC BLOOD PRESSURE: 69 MMHG | BODY MASS INDEX: 26.94 KG/M2 | HEART RATE: 76 BPM

## 2023-11-28 DIAGNOSIS — N63.10 MASS OF RIGHT BREAST, UNSPECIFIED QUADRANT: Primary | ICD-10-CM

## 2023-11-28 PROCEDURE — 99213 PR OFFICE/OUTPT VISIT, EST, LEVL III, 20-29 MIN: ICD-10-PCS | Mod: S$GLB,,, | Performed by: NURSE PRACTITIONER

## 2023-11-28 PROCEDURE — 1159F MED LIST DOCD IN RCRD: CPT | Mod: CPTII,S$GLB,, | Performed by: NURSE PRACTITIONER

## 2023-11-28 PROCEDURE — 99213 OFFICE O/P EST LOW 20 MIN: CPT | Mod: S$GLB,,, | Performed by: NURSE PRACTITIONER

## 2023-11-28 PROCEDURE — 1159F PR MEDICATION LIST DOCUMENTED IN MEDICAL RECORD: ICD-10-PCS | Mod: CPTII,S$GLB,, | Performed by: NURSE PRACTITIONER

## 2023-11-28 PROCEDURE — 3008F BODY MASS INDEX DOCD: CPT | Mod: CPTII,S$GLB,, | Performed by: NURSE PRACTITIONER

## 2023-11-28 PROCEDURE — 3078F DIAST BP <80 MM HG: CPT | Mod: CPTII,S$GLB,, | Performed by: NURSE PRACTITIONER

## 2023-11-28 PROCEDURE — 3074F PR MOST RECENT SYSTOLIC BLOOD PRESSURE < 130 MM HG: ICD-10-PCS | Mod: CPTII,S$GLB,, | Performed by: NURSE PRACTITIONER

## 2023-11-28 PROCEDURE — 3074F SYST BP LT 130 MM HG: CPT | Mod: CPTII,S$GLB,, | Performed by: NURSE PRACTITIONER

## 2023-11-28 PROCEDURE — 3008F PR BODY MASS INDEX (BMI) DOCUMENTED: ICD-10-PCS | Mod: CPTII,S$GLB,, | Performed by: NURSE PRACTITIONER

## 2023-11-28 PROCEDURE — 3078F PR MOST RECENT DIASTOLIC BLOOD PRESSURE < 80 MM HG: ICD-10-PCS | Mod: CPTII,S$GLB,, | Performed by: NURSE PRACTITIONER

## 2023-11-28 PROCEDURE — 1160F PR REVIEW ALL MEDS BY PRESCRIBER/CLIN PHARMACIST DOCUMENTED: ICD-10-PCS | Mod: CPTII,S$GLB,, | Performed by: NURSE PRACTITIONER

## 2023-11-28 PROCEDURE — 1160F RVW MEDS BY RX/DR IN RCRD: CPT | Mod: CPTII,S$GLB,, | Performed by: NURSE PRACTITIONER

## 2023-11-28 NOTE — TELEPHONE ENCOUNTER
----- Message from Elvie Paiz sent at 11/28/2023 10:37 AM CST -----  Patient is calling in regards to  wrong date of birth is on orders for labs...Please call her back at [phone].              Thanks  cathleen

## 2023-11-28 NOTE — TELEPHONE ENCOUNTER
Spoke with pt to let her know that the order is not incorrect. It's not saying she is 73 years old, it's saying her  is . She was going to let the imaging center know.

## 2023-11-28 NOTE — TELEPHONE ENCOUNTER
Spoke to patient. Advised that she can call back and request a different facility to see if there is a sooner availability. Patient voiced understanding.

## 2023-11-28 NOTE — PROGRESS NOTES
Subjective:      Patient ID: Layla Matias is a 48 y.o. female.    Chief Complaint:  Breast exam (Pt found lump in right breast last night.)      History of Present Illness  HPI  Right breast lump x 1 day, had breast reduction in may of this year. Area is TTP. Right breast lateral to areola     Outpatient Medications Marked as Taking for the 23 encounter (Office Visit) with Valeria Fuentes NP   Medication Sig Dispense Refill    calcium-vitamin D3 (OS-RICHELLE 500 + D3) 500 mg-5 mcg (200 unit) per tablet Take 1 tablet by mouth 2 (two) times daily with meals.      multivitamin capsule Take 1 capsule by mouth once daily.       Vitals:    23 0814   BP: 109/69   Pulse: 76   Weight: 78 kg (172 lb)     Past Medical History:   Diagnosis Date    Back pain      Past Surgical History:   Procedure Laterality Date     SECTION      x3    REDUCTION OF BOTH BREASTS Bilateral 2023    RIGHT OOPHORECTOMY      SALPINGECTOMY Bilateral     TOTAL ABDOMINAL HYSTERECTOMY      With BS       GYN & OB History  No LMP recorded. Patient has had a hysterectomy.   Date of Last Pap: No result found    OB History    Para Term  AB Living   3 3           SAB IAB Ectopic Multiple Live Births                  # Outcome Date GA Lbr Surjit/2nd Weight Sex Delivery Anes PTL Lv   3 Para      CS-Unspec      2 Para      CS-Unspec      1 Para      CS-Unspec          Review of Systems  Review of Systems   Constitutional:  Negative for activity change, appetite change, chills, fatigue and fever.   HENT:  Negative for nasal congestion and tinnitus.    Eyes:  Negative for visual disturbance.   Respiratory:  Negative for cough and shortness of breath.    Cardiovascular:  Negative for chest pain and palpitations.   Gastrointestinal:  Negative for abdominal pain, bloating, blood in stool, constipation, nausea and vomiting.   Endocrine: Negative for diabetes, hair loss and hot flashes.   Genitourinary:  Negative for bladder  incontinence, decreased libido, dysmenorrhea, dyspareunia, dysuria, flank pain, frequency, genital sores, hematuria, hot flashes, menorrhagia, menstrual problem, pelvic pain, urgency, vaginal bleeding, vaginal discharge, vaginal pain, urinary incontinence, postcoital bleeding, postmenopausal bleeding, vaginal dryness and vaginal odor.   Musculoskeletal:  Negative for arthralgias, back pain, leg pain and myalgias.   Integumentary:  Negative for rash, acne, hair changes, mole/lesion, breast mass, nipple discharge, breast skin changes and breast tenderness.   Neurological:  Negative for vertigo, syncope, numbness and headaches.   Hematological:  Does not bruise/bleed easily.   Psychiatric/Behavioral:  Negative for depression and sleep disturbance. The patient is not nervous/anxious.    Breast: Positive for lump (right breast x 1 day).Negative for asymmetry, mass, mastodynia, nipple discharge, skin changes and tenderness         Objective:     Physical Exam:    HENT:   Nose: No epistaxis.       Pulmonary/Chest: Right breast exhibits mass and tenderness. Right breast exhibits no inverted nipple, no nipple discharge, no skin change, no bleeding and no swelling. Left breast exhibits no inverted nipple, no mass, no nipple discharge, no skin change, no tenderness, no bleeding and no swelling.                                  Assessment:     1. Mass of right breast, unspecified quadrant               Plan:     Mass of right breast, unspecified quadrant  -     Mammo Digital Diagnostic Right with Subhash; Future; Expected date: 11/28/2023  -      Breast Right Complete; Future; Expected date: 11/28/2023      Follow up for keep yearly appt.

## 2023-11-28 NOTE — TELEPHONE ENCOUNTER
----- Message from Nikki Gale sent at 11/28/2023 12:44 PM CST -----  Type:  Needs Medical Advice    Who Called: Layla Tripp'  Symptoms (please be specific): -   How long has patient had these symptoms:  -  Pharmacy name and phone #:  -  Would the patient rather a call back or a response via [facility]?    Best Call Back Number: [phone]    Additional Information: pt needs to speak w/ nurse please call

## 2024-02-05 ENCOUNTER — DOCUMENTATION ONLY (OUTPATIENT)
Dept: GASTROENTEROLOGY | Facility: CLINIC | Age: 49
End: 2024-02-05
Payer: COMMERCIAL

## 2024-03-15 ENCOUNTER — PATIENT MESSAGE (OUTPATIENT)
Dept: OBSTETRICS AND GYNECOLOGY | Facility: CLINIC | Age: 49
End: 2024-03-15
Payer: COMMERCIAL

## 2024-03-22 ENCOUNTER — OFFICE VISIT (OUTPATIENT)
Dept: OBSTETRICS AND GYNECOLOGY | Facility: CLINIC | Age: 49
End: 2024-03-22
Payer: COMMERCIAL

## 2024-03-22 VITALS
SYSTOLIC BLOOD PRESSURE: 133 MMHG | WEIGHT: 176.19 LBS | BODY MASS INDEX: 27.6 KG/M2 | HEART RATE: 86 BPM | DIASTOLIC BLOOD PRESSURE: 82 MMHG

## 2024-03-22 DIAGNOSIS — K64.4 EXTERNAL HEMORRHOIDS: ICD-10-CM

## 2024-03-22 DIAGNOSIS — Z00.00 BLOOD TESTS FOR ROUTINE GENERAL PHYSICAL EXAMINATION: ICD-10-CM

## 2024-03-22 DIAGNOSIS — Z76.89 ENCOUNTER FOR WEIGHT MANAGEMENT: ICD-10-CM

## 2024-03-22 DIAGNOSIS — Z01.419 GYNECOLOGIC EXAM NORMAL: Primary | ICD-10-CM

## 2024-03-22 PROCEDURE — 1159F MED LIST DOCD IN RCRD: CPT | Mod: CPTII,S$GLB,, | Performed by: NURSE PRACTITIONER

## 2024-03-22 PROCEDURE — 1160F RVW MEDS BY RX/DR IN RCRD: CPT | Mod: CPTII,S$GLB,, | Performed by: NURSE PRACTITIONER

## 2024-03-22 PROCEDURE — 3079F DIAST BP 80-89 MM HG: CPT | Mod: CPTII,S$GLB,, | Performed by: NURSE PRACTITIONER

## 2024-03-22 PROCEDURE — 99459 PELVIC EXAMINATION: CPT | Mod: S$GLB,,, | Performed by: NURSE PRACTITIONER

## 2024-03-22 PROCEDURE — 3075F SYST BP GE 130 - 139MM HG: CPT | Mod: CPTII,S$GLB,, | Performed by: NURSE PRACTITIONER

## 2024-03-22 PROCEDURE — 99396 PREV VISIT EST AGE 40-64: CPT | Mod: S$GLB,,, | Performed by: NURSE PRACTITIONER

## 2024-03-22 PROCEDURE — 3008F BODY MASS INDEX DOCD: CPT | Mod: CPTII,S$GLB,, | Performed by: NURSE PRACTITIONER

## 2024-03-22 RX ORDER — HYDROCORTISONE 25 MG/G
CREAM TOPICAL 2 TIMES DAILY
Qty: 30 G | Refills: 1 | Status: SHIPPED | OUTPATIENT
Start: 2024-03-22

## 2024-03-22 RX ORDER — HYDROCORTISONE 25 MG/G
CREAM TOPICAL 2 TIMES DAILY
Qty: 30 G | Refills: 1 | Status: SHIPPED | OUTPATIENT
Start: 2024-03-22 | End: 2024-03-22

## 2024-03-22 RX ORDER — TIRZEPATIDE 2.5 MG/.5ML
2.5 INJECTION, SOLUTION SUBCUTANEOUS
Qty: 4 PEN | Refills: 0 | Status: SHIPPED | OUTPATIENT
Start: 2024-03-22 | End: 2024-04-21

## 2024-03-22 NOTE — PROGRESS NOTES
Subjective:      Patient ID: Layla Matias is a 48 y.o. female.    Chief Complaint:  Well Woman, Weight Loss (Pt would like to discuss weight loss.), and Hemorrhoids (Pt would like to have her hemorrhoids removed. She wants to know if [facility] can do that or if she needs to go somewhere else.)      History of Present Illness  HPI  Annual Exam-Postmenopausal  Patient presents for annual exam. The patient has no complaints today. The patient is sexually active. GYN screening history: last pap: was normal and last mammogram: was normal. The patient is not taking hormone replacement therapy. Patient denies post-menopausal vaginal bleeding. The patient wears seatbelts: yes. The patient participates in regular exercise:  she lifts a lot at work and walks . Has the patient ever been transfused or tattooed?: no. The patient reports that there is not domestic violence in her life. She reports that her weight has been stagnant. She reports that she does IF an d portion control. She does not have a regular exercise routine but when in [place] she did walk 9 miles a day and did not lose weight. She does have some external hemorrhoids that are bothersome and she would like them removed    Outpatient Medications Marked as Taking for the 3/22/24 encounter (Office Visit) with Valeria Fuentes NP   Medication Sig Dispense Refill    calcium-vitamin D3 (OS-RICHELLE 500 + D3) 500 mg-5 mcg (200 unit) per tablet Take 1 tablet by mouth 2 (two) times daily with meals.      multivitamin capsule Take 1 capsule by mouth once daily.       Vitals:    24 0831   BP: 133/82   Pulse: 86     Past Medical History:   Diagnosis Date    Back pain      Past Surgical History:   Procedure Laterality Date    BREAST SURGERY      Breast reduction     SECTION      x3    REDUCTION OF BOTH BREASTS Bilateral 2023    RIGHT OOPHORECTOMY      SALPINGECTOMY Bilateral     TOTAL ABDOMINAL HYSTERECTOMY      With BS    TUBAL LIGATION  Dec 15,2006       GYN &  OB History  No LMP recorded. Patient has had a hysterectomy.   Date of Last Pap: No result found    OB History    Para Term  AB Living   3 3           SAB IAB Ectopic Multiple Live Births                  # Outcome Date GA Lbr Surjit/2nd Weight Sex Delivery Anes PTL Lv   3 Para      CS-Unspec      2 Para      CS-Unspec      1 Para      CS-Unspec          Review of Systems  Review of Systems   Constitutional:  Positive for unexpected weight change. Negative for activity change, appetite change, chills, fatigue and fever.   HENT:  Negative for nasal congestion and tinnitus.    Eyes:  Negative for visual disturbance.   Respiratory:  Negative for cough and shortness of breath.    Cardiovascular:  Negative for chest pain and palpitations.   Gastrointestinal:  Negative for abdominal pain, bloating, blood in stool, constipation, nausea and vomiting.        External hemorrhoid     Endocrine: Negative for diabetes, hair loss and hot flashes.   Genitourinary:  Negative for bladder incontinence, decreased libido, dysmenorrhea, dyspareunia, dysuria, flank pain, frequency, genital sores, hematuria, hot flashes, menorrhagia, menstrual problem, pelvic pain, urgency, vaginal bleeding, vaginal discharge, vaginal pain, urinary incontinence, postcoital bleeding, postmenopausal bleeding, vaginal dryness and vaginal odor.   Musculoskeletal:  Negative for arthralgias, back pain, leg pain and myalgias.   Integumentary:  Negative for rash, acne, hair changes, mole/lesion, breast mass, nipple discharge, breast skin changes and breast tenderness.   Neurological:  Negative for vertigo, syncope, numbness and headaches.   Hematological:  Does not bruise/bleed easily.   Psychiatric/Behavioral:  Negative for depression and sleep disturbance. The patient is not nervous/anxious.    Breast: Negative for asymmetry, lump, mass, mastodynia, nipple discharge, skin changes and tenderness         Objective:     Physical Exam:   Constitutional:  She is oriented to person, place, and time. She appears well-developed and well-nourished.    HENT:   Nose: No epistaxis.      Cardiovascular:  Normal rate, regular rhythm and normal heart sounds.             Pulmonary/Chest: Effort normal and breath sounds normal.        Abdominal: Soft.     Genitourinary:    Inguinal canal, urethra, bladder, vagina and rectum normal.      Pelvic exam was performed with patient supine.   The external female genitalia was normal.     Labial bartholins normal.There is an absent right adnexa and an absent left adnexa. Cervix is absent.Uterus is absent. Normal urethral meatus.          Musculoskeletal: Normal range of motion and moves all extremeties.       Neurological: She is alert and oriented to person, place, and time.    Skin: Skin is warm and dry.    Psychiatric: She has a normal mood and affect. Her behavior is normal. Judgment and thought content normal.      Chaperone present for exam       Assessment:     1. Gynecologic exam normal    2. Blood tests for routine general physical examination    3. External hemorrhoids    4. Encounter for weight management               Plan:     Gynecologic exam normal  Patient was counseled today on A.C.S. Pap guidelines and recommendations for yearly pelvic exams, mammograms and monthly self breast exams; to see her PCP for other health maintenance.     Blood tests for routine general physical examination  -     Calcitriol; Future; Expected date: 03/22/2024  -     CBC Auto Differential; Future; Expected date: 03/22/2024  -     Comprehensive Metabolic Panel; Future; Expected date: 03/22/2024  -     Hemoglobin A1C; Future; Expected date: 03/22/2024  -     Lipid Panel; Future; Expected date: 03/22/2024  -     TSH; Future; Expected date: 03/22/2024    External hemorrhoids  -     Ambulatory Referral to External Surgery  -     Discontinue: hydrocortisone 2.5 % cream; Apply topically 2 (two) times daily. Please add lidocaine 5%-aka rectal relief  cream  Dispense: 30 g; Refill: 1    Encounter for weight management  -     tirzepatide (MOUNJARO) 2.5 mg/0.5 mL PnIj; Inject 2.5 mg into the skin every 7 days.  Dispense: 4 Pen; Refill: 0  Discussed with the patience the importance of exercising at least three to four days a week, 30-45 minutes a session. Discussed options such as biking, walking, running, and swimming. Discussed increasing fruits and vegetables in diet and decreasing sodium and processed foods. Decrease carbonated beverages.     Other orders  -     hydrocortisone 2.5 % cream; Apply topically 2 (two) times daily. Please add lidocaine 5%-aka rectal relief cream  Dispense: 30 g; Refill: 1    Follow up in about 1 year (around 3/22/2025).

## 2024-03-25 ENCOUNTER — PATIENT MESSAGE (OUTPATIENT)
Dept: OBSTETRICS AND GYNECOLOGY | Facility: CLINIC | Age: 49
End: 2024-03-25
Payer: COMMERCIAL

## 2024-03-25 DIAGNOSIS — Z00.00 ROUTINE GENERAL MEDICAL EXAMINATION AT A HEALTH CARE FACILITY: Primary | ICD-10-CM

## 2024-03-25 LAB
ABS NRBC COUNT: 0 X 10 3/UL (ref 0–0.01)
ABSOLUTE BASOPHIL: 0.04 X 10 3/UL (ref 0–0.22)
ABSOLUTE EOSINOPHIL: 0.33 X 10 3/UL (ref 0.04–0.54)
ABSOLUTE IMMATURE GRAN: 0.02 X 10 3/UL (ref 0–0.04)
ABSOLUTE LYMPHOCYTE: 1.68 X 10 3/UL (ref 0.86–4.75)
ABSOLUTE MONOCYTE: 0.54 X 10 3/UL (ref 0.22–1.08)
ALBUMIN SERPL-MCNC: 4.3 G/DL (ref 3.5–5.2)
ALBUMIN/GLOB SERPL ELPH: 1.4 {RATIO} (ref 1–2.7)
ALP ISOS SERPL LEV INH-CCNC: 102 U/L (ref 35–105)
ALT (SGPT): 13 U/L (ref 0–33)
ANION GAP SERPL CALC-SCNC: 11 MMOL/L (ref 8–17)
AST SERPL-CCNC: 15 U/L (ref 0–32)
BASOPHILS NFR BLD: 0.6 % (ref 0.2–1.2)
BILIRUBIN, TOTAL: 0.46 MG/DL (ref 0–1.2)
BUN/CREAT SERPL: 18.3 (ref 6–20)
CALCIUM SERPL-MCNC: 9 MG/DL (ref 8.6–10.2)
CARBON DIOXIDE, CO2: 26 MMOL/L (ref 22–29)
CHLORIDE: 102 MMOL/L (ref 98–107)
CHOLEST SERPL-MSCNC: 194 MG/DL (ref 100–200)
CREAT SERPL-MCNC: 0.81 MG/DL (ref 0.5–0.9)
EOSINOPHIL NFR BLD: 5 % (ref 0.7–7)
ESTIMATED AVERAGE GLUCOSE: 103 MG/DL
GFR ESTIMATION: 88.93 ML/MIN/1.73M2
GLOBULIN: 3.1 G/DL (ref 1.5–4.5)
GLUCOSE: 101 MG/DL (ref 74–106)
HBA1C MFR BLD: 5.2 % (ref 4–6)
HCT VFR BLD AUTO: 41.7 % (ref 37–47)
HDLC SERPL-MCNC: 67 MG/DL
HGB BLD-MCNC: 13.6 G/DL (ref 12–16)
IMMATURE GRANULOCYTES: 0.3 % (ref 0–0.5)
LDL/HDL RATIO: 1.7 (ref 1–3)
LDLC SERPL CALC-MCNC: 110.6 MG/DL (ref 0–100)
LYMPHOCYTES NFR BLD: 25.5 % (ref 19.3–53.1)
MCH RBC QN AUTO: 28.3 PG (ref 27–32)
MCHC RBC AUTO-ENTMCNC: 32.6 G/DL (ref 32–36)
MCV RBC AUTO: 86.9 FL (ref 82–100)
MONOCYTES NFR BLD: 8.2 % (ref 4.7–12.5)
NEUTROPHILS # BLD AUTO: 3.98 X 10 3/UL (ref 2.15–7.56)
NEUTROPHILS NFR BLD: 60.4 % (ref 34–71.1)
NUCLEATED RED BLOOD CELLS: 0 /100 WBC (ref 0–0.2)
PLATELET # BLD AUTO: 288 X 10 3/UL (ref 135–400)
POTASSIUM: 4.3 MMOL/L (ref 3.5–5.1)
PROT SNV-MCNC: 7.4 G/DL (ref 6.4–8.3)
RBC # BLD AUTO: 4.8 X 10 6/UL (ref 4.2–5.4)
RDW-SD: 39.1 FL (ref 37–54)
SODIUM: 139 MMOL/L (ref 136–145)
TRIGL SERPL-MCNC: 82 MG/DL (ref 0–150)
TSH SERPL DL<=0.005 MIU/L-ACNC: 2.02 UIU/ML (ref 0.27–4.2)
UREA NITROGEN (BUN): 14.8 MG/DL (ref 6–20)
WBC # BLD: 6.59 X 10 3/UL (ref 4.3–10.8)

## 2024-03-26 ENCOUNTER — TELEPHONE (OUTPATIENT)
Dept: OBSTETRICS AND GYNECOLOGY | Facility: CLINIC | Age: 49
End: 2024-03-26
Payer: COMMERCIAL

## 2024-03-26 NOTE — TELEPHONE ENCOUNTER
----- Message from Elvie Paiz sent at 3/26/2024  8:25 AM CDT -----  [facility] [address] is calling for code E11.9 for patient type 2 diabetes  please fax to [phone].            Thanks  cathleen

## 2024-03-26 NOTE — TELEPHONE ENCOUNTER
Spoke with the pharmacist. I told him that the patient is not diabetic so we will not be able to give that diagnosis code and he understood.

## 2024-04-03 LAB
VITAMIN D, 1,25 (OH)2: 45 PG/ML (ref 18–72)
VITAMIN D2, 1,25 (OH)2: <8 PG/ML
VITAMIN D3, 1,25 (OH)2: 45 PG/ML

## 2024-04-05 ENCOUNTER — TELEPHONE (OUTPATIENT)
Dept: OBSTETRICS AND GYNECOLOGY | Facility: CLINIC | Age: 49
End: 2024-04-05
Payer: COMMERCIAL

## 2024-04-05 NOTE — TELEPHONE ENCOUNTER
Called and spoke with patient in regards to her labs and recommendation of taking vitamin d3. Pt is aware and verbalized understanding.   Zara Bustamante

## 2024-04-05 NOTE — TELEPHONE ENCOUNTER
----- Message from Valeria Fuentes NP sent at 4/3/2024  7:38 PM CDT -----  Regarding: FW:  She can take 2000 iu otc vit d3  ----- Message -----  From: Interface, Lab In [facility]  Sent: 3/25/2024  10:00 AM CDT  To: Valeria Fuentes NP

## 2024-04-28 ENCOUNTER — PATIENT MESSAGE (OUTPATIENT)
Dept: OBSTETRICS AND GYNECOLOGY | Facility: CLINIC | Age: 49
End: 2024-04-28
Payer: COMMERCIAL

## 2024-04-30 DIAGNOSIS — Z76.89 ENCOUNTER FOR WEIGHT MANAGEMENT: Primary | ICD-10-CM

## 2024-05-08 ENCOUNTER — PATIENT MESSAGE (OUTPATIENT)
Dept: OBSTETRICS AND GYNECOLOGY | Facility: CLINIC | Age: 49
End: 2024-05-08
Payer: COMMERCIAL

## 2024-05-08 DIAGNOSIS — R11.0 NAUSEA: Primary | ICD-10-CM

## 2024-05-08 RX ORDER — ONDANSETRON 8 MG/1
8 TABLET, ORALLY DISINTEGRATING ORAL 3 TIMES DAILY PRN
Qty: 10 TABLET | Refills: 0 | Status: SHIPPED | OUTPATIENT
Start: 2024-05-08

## 2024-05-23 ENCOUNTER — PATIENT MESSAGE (OUTPATIENT)
Dept: OBSTETRICS AND GYNECOLOGY | Facility: CLINIC | Age: 49
End: 2024-05-23
Payer: COMMERCIAL

## 2024-05-23 DIAGNOSIS — Z76.89 ENCOUNTER FOR WEIGHT MANAGEMENT: ICD-10-CM

## 2024-05-29 ENCOUNTER — OFFICE VISIT (OUTPATIENT)
Dept: OBSTETRICS AND GYNECOLOGY | Facility: CLINIC | Age: 49
End: 2024-05-29
Payer: COMMERCIAL

## 2024-05-29 VITALS
WEIGHT: 173.63 LBS | HEIGHT: 67 IN | HEART RATE: 83 BPM | SYSTOLIC BLOOD PRESSURE: 127 MMHG | BODY MASS INDEX: 27.25 KG/M2 | DIASTOLIC BLOOD PRESSURE: 76 MMHG

## 2024-05-29 DIAGNOSIS — E66.3 OVERWEIGHT (BMI 25.0-29.9): Primary | ICD-10-CM

## 2024-05-29 PROCEDURE — 3008F BODY MASS INDEX DOCD: CPT | Mod: CPTII,S$GLB,, | Performed by: NURSE PRACTITIONER

## 2024-05-29 PROCEDURE — 3044F HG A1C LEVEL LT 7.0%: CPT | Mod: CPTII,S$GLB,, | Performed by: NURSE PRACTITIONER

## 2024-05-29 PROCEDURE — 99212 OFFICE O/P EST SF 10 MIN: CPT | Mod: S$GLB,,, | Performed by: NURSE PRACTITIONER

## 2024-05-29 PROCEDURE — 3074F SYST BP LT 130 MM HG: CPT | Mod: CPTII,S$GLB,, | Performed by: NURSE PRACTITIONER

## 2024-05-29 PROCEDURE — 1160F RVW MEDS BY RX/DR IN RCRD: CPT | Mod: CPTII,S$GLB,, | Performed by: NURSE PRACTITIONER

## 2024-05-29 PROCEDURE — 3078F DIAST BP <80 MM HG: CPT | Mod: CPTII,S$GLB,, | Performed by: NURSE PRACTITIONER

## 2024-05-29 PROCEDURE — 1159F MED LIST DOCD IN RCRD: CPT | Mod: CPTII,S$GLB,, | Performed by: NURSE PRACTITIONER

## 2024-05-29 NOTE — PROGRESS NOTES
"Subjective     Patient ID: Layla Matias is a 48 y.o. female.    Chief Complaint:  Weight and BP Check (Refill Semaglutide)      History of Present Illness  HPI  Pt here for FU on ozempic. She is doing well. Denies abdominal pain. The nausea has subsided. She is not having any reflux. She reports a 5 lb weight loss in one month.     Outpatient Medications Marked as Taking for the 24 encounter (Office Visit) with Valeria Fuentes NP   Medication Sig Dispense Refill    calcium-vitamin D3 (OS-RICHELLE 500 + D3) 500 mg-5 mcg (200 unit) per tablet Take 1 tablet by mouth 2 (two) times daily with meals.      hydrocortisone 2.5 % cream Apply topically 2 (two) times daily. Please add lidocaine 5%-aka rectal relief cream 30 g 1    multivitamin capsule Take 1 capsule by mouth once daily.      ondansetron (ZOFRAN-ODT) 8 MG TbDL Take 1 tablet (8 mg total) by mouth 3 (three) times daily as needed (Nausea). 10 tablet 0    semaglutide (OZEMPIC) 0.25 mg or 0.5 mg (2 mg/3 mL) pen injector Inject 0.5 mg into the skin every 7 days. 4 each 0     Vitals:    24 0815   BP: 127/76   Pulse: 83   Weight: 78.7 kg (173 lb 9.6 oz)   Height: 5' 7" (1.702 m)     Past Medical History:   Diagnosis Date    Back pain      Past Surgical History:   Procedure Laterality Date    BREAST SURGERY      Breast reduction     SECTION      x3    REDUCTION OF BOTH BREASTS Bilateral 2023    RIGHT OOPHORECTOMY      SALPINGECTOMY Bilateral     TOTAL ABDOMINAL HYSTERECTOMY      With BS    TUBAL LIGATION  Dec 15,2006       GYN & OB History  No LMP recorded. Patient has had a hysterectomy.   Date of Last Pap: No result found    OB History    Para Term  AB Living   3 3 3     3   SAB IAB Ectopic Multiple Live Births           3      # Outcome Date GA Lbr Surjit/2nd Weight Sex Type Anes PTL Lv   3 Term      CS-Unspec   GINA   2 Term      CS-Unspec   GINA   1 Term      CS-Unspec   GINA       Review of Systems  Review of Systems "   Constitutional:  Negative for activity change, appetite change, chills, fatigue and fever.   HENT:  Negative for nasal congestion and tinnitus.    Eyes:  Negative for visual disturbance.   Respiratory:  Negative for cough and shortness of breath.    Cardiovascular:  Negative for chest pain and palpitations.   Gastrointestinal:  Negative for abdominal pain, bloating, blood in stool, constipation, nausea and vomiting.   Endocrine: Negative for diabetes, hair loss and hot flashes.   Genitourinary:  Negative for bladder incontinence, decreased libido, dysmenorrhea, dyspareunia, dysuria, flank pain, frequency, genital sores, hematuria, hot flashes, menorrhagia, menstrual problem, pelvic pain, urgency, vaginal bleeding, vaginal discharge, vaginal pain, urinary incontinence, postcoital bleeding, postmenopausal bleeding, vaginal dryness and vaginal odor.   Musculoskeletal:  Negative for arthralgias, back pain, leg pain and myalgias.   Integumentary:  Negative for rash, acne, hair changes, mole/lesion, breast mass, nipple discharge, breast skin changes and breast tenderness.   Neurological:  Negative for vertigo, syncope, numbness and headaches.   Hematological:  Does not bruise/bleed easily.   Psychiatric/Behavioral:  Negative for depression and sleep disturbance. The patient is not nervous/anxious.    Breast: Negative for asymmetry, lump, mass, mastodynia, nipple discharge, skin changes and tenderness         Objective   Physical Exam:   Constitutional: She is oriented to person, place, and time. She appears well-developed and well-nourished. She is cooperative.    HENT:   Nose: No epistaxis.      Cardiovascular:  Normal rate, regular rhythm and normal heart sounds.             Pulmonary/Chest: Effort normal and breath sounds normal. No respiratory distress.        Abdominal: Soft and flat. Bowel sounds are normal.             Musculoskeletal: Normal range of motion and moves all extremeties.      Lymphadenopathy:      She has no cervical adenopathy.    Neurological: She is alert and oriented to person, place, and time.    Skin: Skin is warm and dry.    Psychiatric: Her speech is normal and behavior is normal. Mood, memory, affect, judgment and thought content normal.            Assessment and Plan     1. Overweight (BMI 25.0-29.9)             Plan:  Overweight (BMI 25.0-29.9)      Discussed with the patience the importance of exercising at least three to four days a week, 30-45 minutes a session. Discussed options such as biking, walking, running, and swimming. Discussed increasing fruits and vegetables in diet and decreasing sodium and processed foods. Decrease carbonated beverages.     Follow up in about 2 months (around 7/29/2024).

## 2024-06-07 DIAGNOSIS — Z76.89 ENCOUNTER FOR WEIGHT MANAGEMENT: Primary | ICD-10-CM

## 2024-06-13 ENCOUNTER — OFFICE VISIT (OUTPATIENT)
Dept: PRIMARY CARE CLINIC | Facility: CLINIC | Age: 49
End: 2024-06-13
Payer: COMMERCIAL

## 2024-06-13 ENCOUNTER — CLINICAL SUPPORT (OUTPATIENT)
Dept: OBSTETRICS AND GYNECOLOGY | Facility: CLINIC | Age: 49
End: 2024-06-13
Payer: COMMERCIAL

## 2024-06-13 VITALS
RESPIRATION RATE: 18 BRPM | SYSTOLIC BLOOD PRESSURE: 127 MMHG | DIASTOLIC BLOOD PRESSURE: 79 MMHG | WEIGHT: 170.31 LBS | BODY MASS INDEX: 26.73 KG/M2 | HEART RATE: 77 BPM | HEIGHT: 67 IN | OXYGEN SATURATION: 98 % | TEMPERATURE: 98 F

## 2024-06-13 DIAGNOSIS — Z01.89 ROUTINE LAB DRAW: Primary | ICD-10-CM

## 2024-06-13 DIAGNOSIS — R82.90 FOUL SMELLING URINE: ICD-10-CM

## 2024-06-13 DIAGNOSIS — Z00.00 ANNUAL PHYSICAL EXAM: Primary | ICD-10-CM

## 2024-06-13 DIAGNOSIS — R35.0 URINE FREQUENCY: ICD-10-CM

## 2024-06-13 DIAGNOSIS — Z00.00 ROUTINE GENERAL MEDICAL EXAMINATION AT A HEALTH CARE FACILITY: ICD-10-CM

## 2024-06-13 LAB
APPEARANCE, UA: CLEAR
BACTERIA SPEC CULT: ABNORMAL /HPF
BILIRUB UR QL STRIP: NEGATIVE
CALCIUM OXALATE CRYSTALS, UA: ABNORMAL /LPF
COLOR UR: YELLOW
GLUCOSE (UA): NEGATIVE MG/DL
HGB UR QL STRIP: NEGATIVE
KETONES UR QL STRIP: NEGATIVE MG/DL
LEUKOCYTE ESTERASE UR QL STRIP: NEGATIVE LEU/UL
MUCUS URINE: ABNORMAL /LPF
NITRITE UR QL STRIP: NEGATIVE
PH UR STRIP: 6 PH (ref 5–8)
PROT UR QL STRIP: 10 MG/DL
RBC #/AREA URNS HPF: ABNORMAL /HPF (ref 0–2)
SERVICE COMMENT 03: ABNORMAL
SP GR UR STRIP: 1.03 (ref 1–1.03)
SQUAMOUS EPITHELIAL, UA: ABNORMAL /LPF
UROBILINOGEN UR STRIP-ACNC: NORMAL MG/DL
WBC #/AREA URNS HPF: ABNORMAL /HPF (ref 0–2)

## 2024-06-13 PROCEDURE — 3078F DIAST BP <80 MM HG: CPT | Mod: CPTII,S$GLB,, | Performed by: NURSE PRACTITIONER

## 2024-06-13 PROCEDURE — 3008F BODY MASS INDEX DOCD: CPT | Mod: CPTII,S$GLB,, | Performed by: NURSE PRACTITIONER

## 2024-06-13 PROCEDURE — 3074F SYST BP LT 130 MM HG: CPT | Mod: CPTII,S$GLB,, | Performed by: NURSE PRACTITIONER

## 2024-06-13 PROCEDURE — 3044F HG A1C LEVEL LT 7.0%: CPT | Mod: CPTII,S$GLB,, | Performed by: NURSE PRACTITIONER

## 2024-06-13 PROCEDURE — 99204 OFFICE O/P NEW MOD 45 MIN: CPT | Mod: S$GLB,,, | Performed by: NURSE PRACTITIONER

## 2024-06-13 NOTE — PATIENT INSTRUCTIONS
RTC in 3 months for F/U or sooner if needed.    Keep appts with specialists as scheduled.    Patient Education       Yearly Physical for Adults   About this topic   Most people do not want to be sick. Having a checkup each year with your doctor is one way to help you stay healthy. You may need to see your doctor more or less often. How often you need to go to the doctor depends on your age. Your family and medical history also play a role in how often you need to go to the doctor. Going to see your doctor on a routine basis can help you find problems early or even before they start. This may make it easier to treat or cure your problem.  General   Your doctor will talk about many things during your checkup. Your doctor may ask about:  Your medical and family history.  All the drugs you are taking. Be sure to include all prescription, over the counter, and herbal supplements. Tell the doctor if you have any drug allergy. Bring a list of drugs you take with you.  How you are feeling and if you are having any problems.  Risky behaviors like smoking, drinking alcohol, using illegal drugs, not wearing seatbelts, having unprotected sex, etc.  Your doctor will do a physical exam and may check your:  Height and weight  Blood pressure  Reflexes  Memory  Vision  Hearing  Your doctor may order:  Lab tests  ECG to check your heart rhythm  X-rays  Tests or treatments based on your exam  What lifestyle changes are needed?   Your doctor may suggest you make changes to your lifestyle at this visit. The doctor may talk with you about being more active or lowering stress levels. Ask your doctor what you need to do.  What drugs may be needed?   Your doctor may order drugs or vaccines to protect you from illnesses.  What changes to diet are needed?   Talk to your doctor to see if any changes are needed to your diet.  When do I need to call the doctor?   Call your doctor if you need to learn about any test results. Together you can make  a plan for more care.  Helpful tips   Make a list of questions for your doctor before you go. This will help you remember to ask about any concerns. Write down any answers from your doctor so you can look over them after your visit.   Tell your doctor about any changes in your body or health since your last visit.  Ask your doctor about any screening tests you need.  Where can I learn more?   American Academy [employer]  http://familydoctor.org/familydoctor/en/prevention-wellness/staying-healthy/healthy-living/preventive-services-for-healthy-living.printerview.html   Centers for Disease Control  http://www.cdc.gov/family/checkup/   Last Reviewed Date   2019-04-22  Consumer Information Use and Disclaimer   This information is not specific medical advice and does not replace information you receive from your health care provider. This is only a brief summary of general information. It does NOT include all information about conditions, illnesses, injuries, tests, procedures, treatments, therapies, discharge instructions or life-style choices that may apply to you. You must talk with your health care provider for complete information about your health and treatment options. This information should not be used to decide whether or not to accept your health care providers advice, instructions or recommendations. Only your health care provider has the knowledge and training to provide advice that is right for you.  Copyright   Copyright © 2021 [employer] and its affiliates and/or licensors. All rights reserved.    Patient Education       Low Cholesterol, Saturated Fat, and Trans Fat Diet   About this topic   Cholesterol, saturated fat, and trans fat are in many foods. These may raise your blood cholesterol levels. If your cholesterol is too high, this can cause health problems in your heart, liver, kidneys, and even your eyes. The key to lowering your risk of heart problems is to lower your bad fat  "intake.  Saturated fats and trans fats are the bad fats. These fats clog your arteries and raise your bad cholesterol. Saturated fats and trans fats are solid fats at room temperature. Saturated fats are animal fats. Trans fats are manmade fats. They add flavor to a lot of packaged foods. Staying away from saturated and trans fats will help your heart.  When you do eat foods with fat, make sure they have the good fats. Monounsaturated and polyunsaturated fats are good fats. These fats help raise your good cholesterol and protect your heart.  General   How to Lower Fat and Cholesterol in Your Diet   Read the labels of the foods you buy from the market to find out how much fat is present. Under 5% of total fat on a label means it is "low fat". Over 20% of total fat on a label means it is high fat.  Eat high fiber foods, like soluble fiber. This type of fiber helps lower cholesterol in the body. Choose oatmeal, fruits (like apples), beans, and nuts to get the most soluble fiber.  Eat foods high in omega-3 fatty acids like lisandro seeds, walnuts, salmon, tuna, trout, herring, flaxseed, and soybeans. These foods help keep the heart healthy.  Limit your bad fat and oil intake.  Stay away from butter, stick margarine, shortening, lard, and palm and coconut oil. Pick plant-based spreads instead.  Limit mayonnaise, salad dressings, gravies, and sauces, unless it is made from low-fat ingredients.  Limit chocolate.  Do not eat high-fat processed foods like hot dogs, ho, sausage, ham and other luncheon meats high in fat, and some frozen foods. Pick fish, chicken, turkey, and lean meats instead.  Eat more dried beans, lentils, and tofu to get your protein.  Do not eat organ meats, like liver.  Choose nonfat or low-fat milk, yogurt, and cheese.  Use light or fat-free cream cheese and sour cream.  Eat lots of fruits and vegetables.  Pick whole grain breads, cereals, pastas, and rice.  Do not eat snacks that are high in fats like " granola, cookies, pies, pastries, doughnuts, and croissants.  Stay away from deep fried foods.  Help When Cooking   Remove the fat portion of meats and the skin from poultry before cooking.  Bake, broil, grill, poach, or roast poultry, fish, and lean meats.  Drain and throw away the fat that drains out of meat as you cook it.  Try to add little or no fat to foods.  Use olive or canola oil for cooking or baking.  Steam your vegetables.  Use herbs or no-oil marinades to flavor foods.         Who should use this diet?   This diet is for people who are at high risk of getting health problems like heart disease, high blood pressure, diabetes, and others. This diet is also good for all people to follow to keep your heart healthy.  What foods are good to eat?   Foods with good fats are:  Canola, peanut, and olive oil  Safflower, soybean, and corn oil  Walnuts, almonds, cashews, and peanuts  Pumpkin and sunflower seeds  Trail and tuna  Tofu  Soymilk  Avocado  What foods should be limited or avoided?   Stay away from these types of foods that have saturated fats:  Whole fat dairy products like cheese, ice cream, whole milk, and cream  Palm and coconut oils  High-fat meats like beef, lamb, poultry with the skin, ho, and sausage  Butter and lard  Stay away from these types of foods that may have trans fat:  Cookies, cakes, candy, doughnuts, baked goods, muffins, pizza dough, and pie crusts that are packaged  Fried foods  Frozen dinners  Chips and crackers  Microwave popcorn  Stick margarine and vegetable shortenings  Helpful tips   To help stay away from saturated fat:  Pick lean cuts of meat  Take the skin off chicken and turkey or pick skinless  Pick low-fat cheese, milk, and ice cream  Use liquid oils when cooking and baking, such as olive oil and canola oil  To help stay away from trans fat:  Look at your labels. Choose foods with 0% trans fat. Read the ingredient list. Avoid foods with partially hydrogenated oil in  the ingredient list. This means there is trans fat in the product.  Where can I learn more?   American Heart Association   http://www.heart.org/HEARTORG/Conditions/Cholesterol/PreventionTreatmentofHighCholesterol/Know-Your-Fats_UC_305628_Article.jsp   Last Reviewed Date   2021-10-05  Consumer Information Use and Disclaimer   This information is not specific medical advice and does not replace information you receive from your health care provider. This is only a brief summary of general information. It does NOT include all information about conditions, illnesses, injuries, tests, procedures, treatments, therapies, discharge instructions or life-style choices that may apply to you. You must talk with your health care provider for complete information about your health and treatment options. This information should not be used to decide whether or not to accept your health care providers advice, instructions or recommendations. Only your health care provider has the knowledge and training to provide advice that is right for you.   Copyright   Copyright © 2021 [employer] and its affiliates and/or licensors. All rights reserved.

## 2024-06-13 NOTE — PROGRESS NOTES
Subjective:       Patient ID: Layla Matias is a 48 y.o. female.    Chief Complaint: Establish Care    HPI: Layla is a 48 y.o. female who presents to establish care with new PCP. Previously seen by a Provider in [place], moved here a couple years ago.     C/O foul smelling urine, says she is outside a lot for work so she sweats. Denies discharge.    Denies smoking or drinking.    Cologuard done in in  and was negative, due later this year.    Followed by Valeria Fuentes NP for GYN CARE. Hysterectomy was 4 years ago in [place], still has left ovary. Prescribed ozempic 0.5 mg weekly for obesity .    Mammogram UTD.                    Past Medical History:   Diagnosis Date    Back pain        Past Surgical History:   Procedure Laterality Date    BREAST SURGERY      Breast reduction     SECTION      x3    REDUCTION OF BOTH BREASTS Bilateral 2023    RIGHT OOPHORECTOMY      SALPINGECTOMY Bilateral     TOTAL ABDOMINAL HYSTERECTOMY      With BS    TUBAL LIGATION  Dec 15,2006       Family History   Problem Relation Name Age of Onset    Uterine cancer Mother          ukn age    Breast cancer Neg Hx      Colon cancer Neg Hx      Ovarian cancer Neg Hx      Melanoma Neg Hx      Pancreatic cancer Neg Hx      Prostate cancer Neg Hx         Social History     Tobacco Use    Smoking status: Never    Smokeless tobacco: Never   Substance Use Topics    Alcohol use: Not Currently     Comment: Occasional    Drug use: Never       There is no problem list on file for this patient.        There is no immunization history on file for this patient.        Review of Systems   Constitutional:  Negative for activity change, appetite change, chills, diaphoresis, fatigue and fever.   HENT:  Negative for congestion, ear pain, sinus pain, tinnitus and trouble swallowing.    Eyes:  Negative for visual disturbance.   Respiratory:  Negative for cough, chest tightness, shortness of breath and wheezing.    Cardiovascular:  Negative  "for chest pain, palpitations and leg swelling.   Gastrointestinal:  Negative for abdominal distention, abdominal pain, blood in stool, constipation, diarrhea, nausea and vomiting.   Endocrine: Negative for cold intolerance, heat intolerance, polydipsia, polyphagia and polyuria.   Genitourinary:  Positive for urgency and vaginal discharge. Negative for decreased urine volume, dysuria, frequency, hematuria, pelvic pain, vaginal bleeding and vaginal pain.   Musculoskeletal:  Negative for back pain, gait problem and neck pain.   Skin:  Negative for color change and rash.   Neurological:  Negative for dizziness, syncope, weakness, light-headedness, numbness and headaches.   Hematological:  Negative for adenopathy. Does not bruise/bleed easily.   Psychiatric/Behavioral:  Negative for behavioral problems, confusion and dysphoric mood. The patient is not nervous/anxious.      Objective:     Vitals:    06/13/24 0957   BP: 127/79   BP Location: Right arm   Patient Position: Sitting   BP Method: Small (Automatic)   Pulse: 77   Resp: 18   Temp: 98.2 °F (36.8 °C)   TempSrc: Oral   SpO2: 98%   Weight: 77.2 kg (170 lb 4.8 oz)   Height: 5' 7" (1.702 m)       Physical Exam  Vitals and nursing note reviewed.   Constitutional:       General: She is not in acute distress.     Appearance: Normal appearance. She is not diaphoretic.   HENT:      Head: Normocephalic and atraumatic.      Nose: Nose normal.      Mouth/Throat:      Mouth: Mucous membranes are moist.      Pharynx: Oropharynx is clear.   Eyes:      General:         Right eye: No discharge.         Left eye: No discharge.      Extraocular Movements: Extraocular movements intact.      Conjunctiva/sclera: Conjunctivae normal.      Pupils: Pupils are equal, round, and reactive to light.   Cardiovascular:      Rate and Rhythm: Normal rate and regular rhythm.      Pulses: Normal pulses.      Heart sounds: Normal heart sounds.   Pulmonary:      Effort: Pulmonary effort is normal.      " Breath sounds: Normal breath sounds. No stridor. No wheezing or rales.   Abdominal:      General: Bowel sounds are normal. There is no distension.      Palpations: Abdomen is soft.      Tenderness: There is no abdominal tenderness. There is no guarding.   Musculoskeletal:         General: No tenderness. Normal range of motion.      Cervical back: Normal range of motion.      Right lower leg: No edema.      Left lower leg: No edema.   Lymphadenopathy:      Cervical: No cervical adenopathy.   Skin:     General: Skin is warm and dry.      Capillary Refill: Capillary refill takes less than 2 seconds.      Findings: No rash.   Neurological:      General: No focal deficit present.      Mental Status: She is alert and oriented to person, place, and time.   Psychiatric:         Mood and Affect: Mood and affect normal.         Speech: Speech normal.         Behavior: Behavior normal.         Cognition and Memory: Cognition and memory normal.         Judgment: Judgment normal.         Orders Only on 06/13/2024   Component Date Value Ref Range Status    Color, UA 06/13/2024 Yellow  Yellow Final    Appearance, UA 06/13/2024 Clear  Clear Final    pH, UA 06/13/2024 6.0  5.0 - 8.0 pH Final    Specific Gravity, UA 06/13/2024 1.027  1.005 - 1.030 Final    Protein, UA 06/13/2024 10 (A)  Negative mg/dL Final    Glucose, UA 06/13/2024 Negative  Negative mg/dL Final    Ketones, UA 06/13/2024 Negative  Negative mg/dL Final    Occult Blood UA 06/13/2024 Negative  Negative Final    Nitrite, UA 06/13/2024 Negative  Negative Final    Bilirubin (UA) 06/13/2024 Negative  Negative Final    Urobilinogen, UA 06/13/2024 Normal  Normal mg/dL Final    Leukocytes, UA 06/13/2024 Negative  Negative Ashli/uL Final    RBC, UA 06/13/2024 0-2  0 - 2 /HPF Final    WBC, UA 06/13/2024 0-2  0 - 2 /HPF Final    Squam Epithel, UA 06/13/2024 3+ Many (A)  0 - 1+ /LPF Final    Ca Oxalate Letty, UA 06/13/2024 3+ Many (A)  None Seen /LPF Final    Bacteria 06/13/2024 +/-  Rare  0-+/- /HPF Final    MUCUS URINE 06/13/2024 1+  0-1+ /LPF Final    Service Comment 03 06/13/2024 No, Criteria Not Met   Final   Office Visit on 03/22/2024   Component Date Value Ref Range Status    Vitamin D, 1,25 (OH)2 03/25/2024 45  18 - 72 pg/mL Final    Vitamin D3, 1,25 (OH)2 03/25/2024 45  pg/mL Final    Vitamin D2, 1,25 (OH)2 03/25/2024 <8  pg/mL Final    WBC 03/25/2024 6.59  4.3 - 10.8 X 10 3/ul Final    RBC 03/25/2024 4.80  4.2 - 5.4 X 10 6/ul Final    RDW-SD 03/25/2024 39.1  37 - 54 fl Final    Hemoglobin 03/25/2024 13.6  12 - 16 g/dL Final    Hematocrit 03/25/2024 41.7  37 - 47 % Final    MCV 03/25/2024 86.9  82 - 100 fl Final    MCH 03/25/2024 28.3  27 - 32 pg Final    MCHC 03/25/2024 32.6  32 - 36 g/dL Final    Platelets 03/25/2024 288  135 - 400 X 10 3/ul Final    Neutrophils 03/25/2024 60.4  34 - 71.1 % Final    Lymphocytes 03/25/2024 25.5  19.3 - 53.1 % Final    Monocytes 03/25/2024 8.2  4.7 - 12.5 % Final    Eosinophils 03/25/2024 5.0  0.7 - 7.0 % Final    Basophils 03/25/2024 0.6  0.2 - 1.2 % Final    Neutrophils Absolute 03/25/2024 3.98  2.15 - 7.56 X 10 3/ul Final    Lymphocytes Absolute 03/25/2024 1.68  0.86 - 4.75 X 10 3/ul Final    Monocytes Absolute 03/25/2024 0.54  0.22 - 1.08 X 10 3/ul Final    Eosinophils Absolute 03/25/2024 0.33  0.04 - 0.54 X 10 3/ul Final    Basophils Absolute 03/25/2024 0.04  0.00 - 0.22 X 10 3/ul Final    Immature Granulocytes Absolute 03/25/2024 0.02  0 - 0.04 X 10 3/ul Final    Immature Granulocytes 03/25/2024 0.3  0 - 0.5 % Final    nRBC# 03/25/2024 0.0  0 - 0.2 /100 WBC Final    nRBC Count Absolute 03/25/2024 0.000  0 - 0.012 x 10 3/ul Final    Glucose 03/25/2024 101  74 - 106 mg/dL Final    BUN 03/25/2024 14.8  6 - 20 mg/dL Final    Creatinine 03/25/2024 0.81  0.50 - 0.90 mg/dL Final    AST 03/25/2024 15  0 - 32 U/L Final    ALT (SGPT) 03/25/2024 13  0 - 33 U/L Final    Alkaline Phosphatase 03/25/2024 102  35 - 105 U/L Final    Calcium 03/25/2024 9.0  8.6 -  10.2 mg/dL Final    Protein, Total 03/25/2024 7.4  6.4 - 8.3 g/dL Final    Albumin 03/25/2024 4.3  3.5 - 5.2 g/dL Final    BILIRUBIN, TOTAL 03/25/2024 0.46  0.00 - 1.20 mg/dL Final    Sodium 03/25/2024 139  136 - 145 mmol/L Final    Potassium 03/25/2024 4.3  3.5 - 5.1 mmol/L Final    Chloride 03/25/2024 102  98 - 107 mmol/L Final    CO2 03/25/2024 26  22 - 29 mmol/L Final    Globulin 03/25/2024 3.1  1.5 - 4.5 g/dL Final    Albumin/Globulin Ratio 03/25/2024 1.4  1.0 - 2.7 Final    BUN/Creatinine Ratio 03/25/2024 18.3  6 - 20 Final    GFR ESTIMATION 03/25/2024 88.93  >60.00 mL/min/1.73m2 Final    Anion Gap 03/25/2024 11.0  8.0 - 17.0 mmol/L Final    Hemoglobin A1C 03/25/2024 5.2  4.0 - 6.0 % Final    EST AVERAGE GLUCOSE 03/25/2024 103  NORMAL MG/DL Final    Cholesterol 03/25/2024 194  100 - 200 mg/dL Final    Triglycerides 03/25/2024 82  0 - 150 mg/dL Final    HDL 03/25/2024 67  >60 mg/dL Final    LDL Cholesterol 03/25/2024 110.6 (H)  0 - 100 mg/dL Final    LDL/HDL Ratio 03/25/2024 1.7  1 - 3 Final    TSH 03/25/2024 2.02  0.27 - 4.20 uIU/mL Final         Assessment:      1. Annual physical exam    2. Routine general medical examination at a health care facility    3. Urine frequency    4. Foul smelling urine          Plan:     Annual physical exam    Routine general medical examination at a health care facility  -     Ambulatory referral/consult to Family Practice    Urine frequency  -     Urinalysis, Reflex to Urine Culture Urine, Clean Catch; Future; Expected date: 06/13/2024    Foul smelling urine  -     Urinalysis, Reflex to Urine Culture Urine, Clean Catch; Future; Expected date: 06/13/2024         Current Outpatient Medications   Medication Sig Dispense Refill    calcium-vitamin D3 (OS-RICHELLE 500 + D3) 500 mg-5 mcg (200 unit) per tablet Take 1 tablet by mouth 2 (two) times daily with meals.      hydrocortisone 2.5 % cream Apply topically 2 (two) times daily. Please add lidocaine 5%-aka rectal relief cream 30 g 1     multivitamin capsule Take 1 capsule by mouth once daily.      ondansetron (ZOFRAN-ODT) 8 MG TbDL Take 1 tablet (8 mg total) by mouth 3 (three) times daily as needed (Nausea). 10 tablet 0    semaglutide (OZEMPIC) 0.25 mg or 0.5 mg (2 mg/3 mL) pen injector Inject 0.5 mg into the skin every 7 days. 4 each 0     No current facility-administered medications for this visit.       There are no discontinued medications.    Health Maintenance   Topic Date Due    Hepatitis C Screening  Never done    TETANUS VACCINE  Never done    Colorectal Cancer Screening  09/20/2024    Mammogram  12/05/2024    Lipid Panel  03/25/2029       Patient Instructions   RTC in 3 months for F/U or sooner if needed.    Keep appts with specialists as scheduled.    Patient Education       Yearly Physical for Adults   About this topic   Most people do not want to be sick. Having a checkup each year with your doctor is one way to help you stay healthy. You may need to see your doctor more or less often. How often you need to go to the doctor depends on your age. Your family and medical history also play a role in how often you need to go to the doctor. Going to see your doctor on a routine basis can help you find problems early or even before they start. This may make it easier to treat or cure your problem.  General   Your doctor will talk about many things during your checkup. Your doctor may ask about:  Your medical and family history.  All the drugs you are taking. Be sure to include all prescription, over the counter, and herbal supplements. Tell the doctor if you have any drug allergy. Bring a list of drugs you take with you.  How you are feeling and if you are having any problems.  Risky behaviors like smoking, drinking alcohol, using illegal drugs, not wearing seatbelts, having unprotected sex, etc.  Your doctor will do a physical exam and may check your:  Height and weight  Blood pressure  Reflexes  Memory  Vision  Hearing  Your doctor may  order:  Lab tests  ECG to check your heart rhythm  X-rays  Tests or treatments based on your exam  What lifestyle changes are needed?   Your doctor may suggest you make changes to your lifestyle at this visit. The doctor may talk with you about being more active or lowering stress levels. Ask your doctor what you need to do.  What drugs may be needed?   Your doctor may order drugs or vaccines to protect you from illnesses.  What changes to diet are needed?   Talk to your doctor to see if any changes are needed to your diet.  When do I need to call the doctor?   Call your doctor if you need to learn about any test results. Together you can make a plan for more care.  Helpful tips   Make a list of questions for your doctor before you go. This will help you remember to ask about any concerns. Write down any answers from your doctor so you can look over them after your visit.   Tell your doctor about any changes in your body or health since your last visit.  Ask your doctor about any screening tests you need.  Where can I learn more?   American Academy [employer]icians  http://familydoctor.org/familydoctor/en/prevention-wellness/staying-healthy/healthy-living/preventive-services-for-healthy-living.printerview.html   Centers for Disease Control  http://www.cdc.gov/family/checkup/   Last Reviewed Date   2019-04-22  Consumer Information Use and Disclaimer   This information is not specific medical advice and does not replace information you receive from your health care provider. This is only a brief summary of general information. It does NOT include all information about conditions, illnesses, injuries, tests, procedures, treatments, therapies, discharge instructions or life-style choices that may apply to you. You must talk with your health care provider for complete information about your health and treatment options. This information should not be used to decide whether or not to accept your health care providers  "advice, instructions or recommendations. Only your health care provider has the knowledge and training to provide advice that is right for you.  Copyright   Copyright © 2021 [employer] and its affiliates and/or licensors. All rights reserved.    Patient Education       Low Cholesterol, Saturated Fat, and Trans Fat Diet   About this topic   Cholesterol, saturated fat, and trans fat are in many foods. These may raise your blood cholesterol levels. If your cholesterol is too high, this can cause health problems in your heart, liver, kidneys, and even your eyes. The key to lowering your risk of heart problems is to lower your bad fat intake.  Saturated fats and trans fats are the bad fats. These fats clog your arteries and raise your bad cholesterol. Saturated fats and trans fats are solid fats at room temperature. Saturated fats are animal fats. Trans fats are manmade fats. They add flavor to a lot of packaged foods. Staying away from saturated and trans fats will help your heart.  When you do eat foods with fat, make sure they have the good fats. Monounsaturated and polyunsaturated fats are good fats. These fats help raise your good cholesterol and protect your heart.  General   How to Lower Fat and Cholesterol in Your Diet   Read the labels of the foods you buy from the market to find out how much fat is present. Under 5% of total fat on a label means it is "low fat". Over 20% of total fat on a label means it is high fat.  Eat high fiber foods, like soluble fiber. This type of fiber helps lower cholesterol in the body. Choose oatmeal, fruits (like apples), beans, and nuts to get the most soluble fiber.  Eat foods high in omega-3 fatty acids like lisandro seeds, walnuts, salmon, tuna, trout, herring, flaxseed, and soybeans. These foods help keep the heart healthy.  Limit your bad fat and oil intake.  Stay away from butter, stick margarine, shortening, lard, and palm and coconut oil. Pick plant-based spreads " instead.  Limit mayonnaise, salad dressings, gravies, and sauces, unless it is made from low-fat ingredients.  Limit chocolate.  Do not eat high-fat processed foods like hot dogs, ho, sausage, ham and other luncheon meats high in fat, and some frozen foods. Pick fish, chicken, turkey, and lean meats instead.  Eat more dried beans, lentils, and tofu to get your protein.  Do not eat organ meats, like liver.  Choose nonfat or low-fat milk, yogurt, and cheese.  Use light or fat-free cream cheese and sour cream.  Eat lots of fruits and vegetables.  Pick whole grain breads, cereals, pastas, and rice.  Do not eat snacks that are high in fats like granola, cookies, pies, pastries, doughnuts, and croissants.  Stay away from deep fried foods.  Help When Cooking   Remove the fat portion of meats and the skin from poultry before cooking.  Bake, broil, grill, poach, or roast poultry, fish, and lean meats.  Drain and throw away the fat that drains out of meat as you cook it.  Try to add little or no fat to foods.  Use olive or canola oil for cooking or baking.  Steam your vegetables.  Use herbs or no-oil marinades to flavor foods.         Who should use this diet?   This diet is for people who are at high risk of getting health problems like heart disease, high blood pressure, diabetes, and others. This diet is also good for all people to follow to keep your heart healthy.  What foods are good to eat?   Foods with good fats are:  Canola, peanut, and olive oil  Safflower, soybean, and corn oil  Walnuts, almonds, cashews, and peanuts  Pumpkin and sunflower seeds  [place] and tuna  Tofu  Soymilk  Avocado  What foods should be limited or avoided?   Stay away from these types of foods that have saturated fats:  Whole fat dairy products like cheese, ice cream, whole milk, and cream  Palm and coconut oils  High-fat meats like beef, lamb, poultry with the skin, ho, and sausage  Butter and lard  Stay away from these types of foods  that may have trans fat:  Cookies, cakes, candy, doughnuts, baked goods, muffins, pizza dough, and pie crusts that are packaged  Fried foods  Frozen dinners  Chips and crackers  Microwave popcorn  Stick margarine and vegetable shortenings  Helpful tips   To help stay away from saturated fat:  Pick lean cuts of meat  Take the skin off chicken and turkey or pick skinless  Pick low-fat cheese, milk, and ice cream  Use liquid oils when cooking and baking, such as olive oil and canola oil  To help stay away from trans fat:  Look at your labels. Choose foods with 0% trans fat. Read the ingredient list. Avoid foods with partially hydrogenated oil in the ingredient list. This means there is trans fat in the product.  Where can I learn more?   American Heart Association   http://www.heart.org/HEARTORG/Conditions/Cholesterol/PreventionTreatmentofHighCholesterol/Know-Your-Fats_[facility]_305628_Article.jsp   Last Reviewed Date   2021-10-05  Consumer Information Use and Disclaimer   This information is not specific medical advice and does not replace information you receive from your health care provider. This is only a brief summary of general information. It does NOT include all information about conditions, illnesses, injuries, tests, procedures, treatments, therapies, discharge instructions or life-style choices that may apply to you. You must talk with your health care provider for complete information about your health and treatment options. This information should not be used to decide whether or not to accept your health care providers advice, instructions or recommendations. Only your health care provider has the knowledge and training to provide advice that is right for you.   Copyright   Copyright © 2021 [employer]. and its affiliates and/or licensors. All rights reserved.        Risks, benefits, and alternatives discussed with patient, Patient verbalized understanding of discussed plan of care. Asked patient if any  further questions, answered no.    Future Appointments   Date Time Provider Department Center   9/20/2024  9:20 AM Isabel Bradshaw NP [id number]   3/24/2025  9:00 AM Valeria Fuentes NP [id number] NP

## 2024-06-19 DIAGNOSIS — N30.00 ACUTE CYSTITIS WITHOUT HEMATURIA: Primary | ICD-10-CM

## 2024-06-19 RX ORDER — NITROFURANTOIN 25; 75 MG/1; MG/1
100 CAPSULE ORAL 2 TIMES DAILY
Qty: 10 CAPSULE | Refills: 0 | Status: SHIPPED | OUTPATIENT
Start: 2024-06-19 | End: 2024-06-24

## 2024-07-17 ENCOUNTER — PATIENT MESSAGE (OUTPATIENT)
Dept: OBSTETRICS AND GYNECOLOGY | Facility: CLINIC | Age: 49
End: 2024-07-17
Payer: COMMERCIAL

## 2024-07-19 ENCOUNTER — OFFICE VISIT (OUTPATIENT)
Dept: OBSTETRICS AND GYNECOLOGY | Facility: CLINIC | Age: 49
End: 2024-07-19
Payer: COMMERCIAL

## 2024-07-19 VITALS
HEIGHT: 67 IN | DIASTOLIC BLOOD PRESSURE: 78 MMHG | HEART RATE: 107 BPM | BODY MASS INDEX: 25.64 KG/M2 | SYSTOLIC BLOOD PRESSURE: 114 MMHG | WEIGHT: 163.38 LBS

## 2024-07-19 DIAGNOSIS — E66.3 OVERWEIGHT (BMI 25.0-29.9): Primary | ICD-10-CM

## 2024-07-19 PROCEDURE — 3074F SYST BP LT 130 MM HG: CPT | Mod: CPTII,S$GLB,, | Performed by: NURSE PRACTITIONER

## 2024-07-19 PROCEDURE — 3078F DIAST BP <80 MM HG: CPT | Mod: CPTII,S$GLB,, | Performed by: NURSE PRACTITIONER

## 2024-07-19 PROCEDURE — 1160F RVW MEDS BY RX/DR IN RCRD: CPT | Mod: CPTII,S$GLB,, | Performed by: NURSE PRACTITIONER

## 2024-07-19 PROCEDURE — 99213 OFFICE O/P EST LOW 20 MIN: CPT | Mod: S$GLB,,, | Performed by: NURSE PRACTITIONER

## 2024-07-19 PROCEDURE — 3044F HG A1C LEVEL LT 7.0%: CPT | Mod: CPTII,S$GLB,, | Performed by: NURSE PRACTITIONER

## 2024-07-19 PROCEDURE — 3008F BODY MASS INDEX DOCD: CPT | Mod: CPTII,S$GLB,, | Performed by: NURSE PRACTITIONER

## 2024-07-19 PROCEDURE — 1159F MED LIST DOCD IN RCRD: CPT | Mod: CPTII,S$GLB,, | Performed by: NURSE PRACTITIONER

## 2024-07-19 NOTE — PROGRESS NOTES
"Subjective     Patient ID: Layla Matias is a 48 y.o. female.    Chief Complaint:  Medication Management      History of Present Illness  HPI  Pt presents to the clinic for weight management education.  Patient reports that she has lost 10 lb since her last visit to the office.  She has been eating smaller meals throughout the day and exercising while at work throughout the day.  She denies vomiting, diarrhea, constipation.  She does report occasional nausea but she takes an over-the-counter ginger supplement and the nausea subsides.  She does feel like she has hit a plateau with her weight loss    Outpatient Medications Marked as Taking for the 24 encounter (Office Visit) with Valeria Fuentes NP   Medication Sig Dispense Refill    calcium-vitamin D3 (OS-RICHELLE 500 + D3) 500 mg-5 mcg (200 unit) per tablet Take 1 tablet by mouth 2 (two) times daily with meals.      hydrocortisone 2.5 % cream Apply topically 2 (two) times daily. Please add lidocaine 5%-aka rectal relief cream 30 g 1    multivitamin capsule Take 1 capsule by mouth once daily.      ondansetron (ZOFRAN-ODT) 8 MG TbDL Take 1 tablet (8 mg total) by mouth 3 (three) times daily as needed (Nausea). 10 tablet 0    semaglutide (OZEMPIC) 0.25 mg or 0.5 mg (2 mg/3 mL) pen injector Inject 0.5 mg into the skin every 7 days. 4 each 0     Vitals:    24 0828   BP: 114/78   Pulse: 107   Weight: 74.1 kg (163 lb 6.4 oz)   Height: 5' 7" (1.702 m)     Past Medical History:   Diagnosis Date    Back pain      Past Surgical History:   Procedure Laterality Date    BREAST SURGERY      Breast reduction     SECTION      x3    REDUCTION OF BOTH BREASTS Bilateral 2023    RIGHT OOPHORECTOMY      SALPINGECTOMY Bilateral     TOTAL ABDOMINAL HYSTERECTOMY      With BS    TUBAL LIGATION  Dec 15,2006       GYN & OB History  No LMP recorded. Patient has had a hysterectomy.   Date of Last Pap: 2023    OB History    Para Term  AB Living   3 3 3    "  3   SAB IAB Ectopic Multiple Live Births           3      # Outcome Date GA Lbr Surjit/2nd Weight Sex Type Anes PTL Lv   3 Term      CS-Unspec   GINA   2 Term      CS-Unspec   GINA   1 Term      CS-Unspec   GINA       Review of Systems  Review of Systems   Constitutional:  Positive for unexpected weight change. Negative for activity change, appetite change, chills, fatigue and fever.   HENT:  Negative for nasal congestion and tinnitus.    Eyes:  Negative for visual disturbance.   Respiratory:  Negative for cough and shortness of breath.    Cardiovascular:  Negative for chest pain and palpitations.   Gastrointestinal:  Negative for abdominal pain, bloating, blood in stool, constipation, nausea and vomiting.   Endocrine: Negative for diabetes, hair loss and hot flashes.   Genitourinary:  Negative for bladder incontinence, decreased libido, dysmenorrhea, dyspareunia, dysuria, flank pain, frequency, genital sores, hematuria, hot flashes, menorrhagia, menstrual problem, pelvic pain, urgency, vaginal bleeding, vaginal discharge, vaginal pain, urinary incontinence, postcoital bleeding, postmenopausal bleeding, vaginal dryness and vaginal odor.   Musculoskeletal:  Negative for arthralgias, back pain, leg pain and myalgias.   Integumentary:  Negative for rash, acne, hair changes, mole/lesion, breast mass, nipple discharge, breast skin changes and breast tenderness.   Neurological:  Negative for vertigo, syncope, numbness and headaches.   Hematological:  Does not bruise/bleed easily.   Psychiatric/Behavioral:  Negative for depression and sleep disturbance. The patient is not nervous/anxious.    Breast: Negative for asymmetry, lump, mass, mastodynia, nipple discharge, skin changes and tenderness         Objective   Physical Exam:   Constitutional: She is oriented to person, place, and time. She appears well-developed and well-nourished. She is cooperative.    HENT:   Nose: No epistaxis.      Cardiovascular:  Normal rate, regular  rhythm and normal heart sounds.             Pulmonary/Chest: Effort normal and breath sounds normal. No respiratory distress.        Abdominal: Soft and flat. Bowel sounds are normal.             Musculoskeletal: Normal range of motion and moves all extremeties.      Lymphadenopathy:     She has no cervical adenopathy.    Neurological: She is alert and oriented to person, place, and time.    Skin: Skin is warm and dry.    Psychiatric: Her speech is normal and behavior is normal. Mood, memory, affect, judgment and thought content normal.            Assessment and Plan     1. Overweight (BMI 25.0-29.9)             Plan:  Overweight (BMI 25.0-29.9)  -     semaglutide (OZEMPIC) 1 mg/dose (4 mg/3 mL); Inject 1 mg into the skin every 7 days.  Dispense: 3 mL; Refill: 0      Discussed with the patience the importance of exercising at least three to four days a week, 30-45 minutes a session. Discussed options such as biking, walking, running, and swimming. Discussed increasing fruits and vegetables in diet and decreasing sodium and processed foods. Decrease carbonated beverages.     Follow up if symptoms worsen or fail to improve.

## 2024-08-19 ENCOUNTER — PATIENT MESSAGE (OUTPATIENT)
Dept: OBSTETRICS AND GYNECOLOGY | Facility: CLINIC | Age: 49
End: 2024-08-19
Payer: COMMERCIAL

## 2024-08-20 ENCOUNTER — PATIENT MESSAGE (OUTPATIENT)
Dept: OBSTETRICS AND GYNECOLOGY | Facility: CLINIC | Age: 49
End: 2024-08-20
Payer: COMMERCIAL

## 2024-08-22 ENCOUNTER — PATIENT MESSAGE (OUTPATIENT)
Dept: OBSTETRICS AND GYNECOLOGY | Facility: CLINIC | Age: 49
End: 2024-08-22
Payer: COMMERCIAL

## 2024-08-22 DIAGNOSIS — E66.3 OVERWEIGHT (BMI 25.0-29.9): Primary | ICD-10-CM

## 2024-09-15 ENCOUNTER — PATIENT MESSAGE (OUTPATIENT)
Dept: OBSTETRICS AND GYNECOLOGY | Facility: CLINIC | Age: 49
End: 2024-09-15
Payer: COMMERCIAL

## 2024-09-15 DIAGNOSIS — E66.3 OVERWEIGHT (BMI 25.0-29.9): ICD-10-CM

## 2024-09-17 ENCOUNTER — CLINICAL SUPPORT (OUTPATIENT)
Dept: OBSTETRICS AND GYNECOLOGY | Facility: CLINIC | Age: 49
End: 2024-09-17
Payer: COMMERCIAL

## 2024-09-17 VITALS
SYSTOLIC BLOOD PRESSURE: 117 MMHG | BODY MASS INDEX: 24.59 KG/M2 | HEART RATE: 79 BPM | WEIGHT: 157 LBS | DIASTOLIC BLOOD PRESSURE: 76 MMHG

## 2024-09-17 DIAGNOSIS — Z76.89 ENCOUNTER FOR WEIGHT MANAGEMENT: Primary | ICD-10-CM

## 2024-09-30 DIAGNOSIS — E66.3 OVERWEIGHT (BMI 25.0-29.9): ICD-10-CM

## 2024-11-25 ENCOUNTER — PATIENT MESSAGE (OUTPATIENT)
Dept: OBSTETRICS AND GYNECOLOGY | Facility: CLINIC | Age: 49
End: 2024-11-25
Payer: COMMERCIAL

## 2024-11-25 RX ORDER — SEMAGLUTIDE 1.34 MG/ML
1 INJECTION, SOLUTION SUBCUTANEOUS
Qty: 3 ML | Refills: 0 | Status: SHIPPED | OUTPATIENT
Start: 2024-11-25

## 2024-12-10 ENCOUNTER — OFFICE VISIT (OUTPATIENT)
Dept: OBSTETRICS AND GYNECOLOGY | Facility: CLINIC | Age: 49
End: 2024-12-10
Payer: COMMERCIAL

## 2024-12-10 VITALS
BODY MASS INDEX: 24.65 KG/M2 | DIASTOLIC BLOOD PRESSURE: 74 MMHG | SYSTOLIC BLOOD PRESSURE: 107 MMHG | HEART RATE: 81 BPM | HEIGHT: 64 IN | WEIGHT: 144.38 LBS

## 2024-12-10 DIAGNOSIS — Z12.11 COLON CANCER SCREENING: ICD-10-CM

## 2024-12-10 DIAGNOSIS — E66.3 OVERWEIGHT (BMI 25.0-29.9): Primary | ICD-10-CM

## 2024-12-10 DIAGNOSIS — M54.50 ACUTE MIDLINE LOW BACK PAIN WITHOUT SCIATICA: ICD-10-CM

## 2024-12-10 PROCEDURE — 3074F SYST BP LT 130 MM HG: CPT | Mod: CPTII,,, | Performed by: NURSE PRACTITIONER

## 2024-12-10 PROCEDURE — 3044F HG A1C LEVEL LT 7.0%: CPT | Mod: CPTII,,, | Performed by: NURSE PRACTITIONER

## 2024-12-10 PROCEDURE — 1160F RVW MEDS BY RX/DR IN RCRD: CPT | Mod: CPTII,,, | Performed by: NURSE PRACTITIONER

## 2024-12-10 PROCEDURE — 1159F MED LIST DOCD IN RCRD: CPT | Mod: CPTII,,, | Performed by: NURSE PRACTITIONER

## 2024-12-10 PROCEDURE — 3008F BODY MASS INDEX DOCD: CPT | Mod: CPTII,,, | Performed by: NURSE PRACTITIONER

## 2024-12-10 PROCEDURE — 3078F DIAST BP <80 MM HG: CPT | Mod: CPTII,,, | Performed by: NURSE PRACTITIONER

## 2024-12-10 PROCEDURE — 99213 OFFICE O/P EST LOW 20 MIN: CPT | Mod: S$PBB,,, | Performed by: NURSE PRACTITIONER

## 2024-12-10 RX ORDER — SEMAGLUTIDE 1.34 MG/ML
1 INJECTION, SOLUTION SUBCUTANEOUS
Qty: 3 ML | Refills: 0 | Status: SHIPPED | OUTPATIENT
Start: 2024-12-10

## 2024-12-10 NOTE — PROGRESS NOTES
"Subjective     Patient ID: Layla Matias is a 49 y.o. female.    Chief Complaint:  Mounjaro F/u (Pt has no c/o )      History of Present Illness  HPI  Patient presents to the clinic for follow-up on obesity management.  She reports that she is doing well on the Ozempic.  She is currently walking for exercise and following a well-balanced diet.  She denies any abdominal pain, diarrhea but she does however report occasional constipation that she has been combatting with a daily fiber supplement.     Patient also reports that she started having tailbone pain in October.  She was on vacation and thought perhaps it was because she was sitting in the car for long periods of time however the tailbone pain has not resolved and it is very tender to the touch.  She is concerned that there is something more significant going on.  She denies any recent injury or fall.    Patient is also due for colonoscopy at this time  Outpatient Medications Marked as Taking for the 12/10/24 encounter (Office Visit) with Valeria Fuentes NP   Medication Sig Dispense Refill    calcium-vitamin D3 (OS-RICHELLE 500 + D3) 500 mg-5 mcg (200 unit) per tablet Take 1 tablet by mouth 2 (two) times daily with meals.      hydrocortisone 2.5 % cream Apply topically 2 (two) times daily. Please add lidocaine 5%-aka rectal relief cream 30 g 1    multivitamin capsule Take 1 capsule by mouth once daily.      ondansetron (ZOFRAN-ODT) 8 MG TbDL Take 1 tablet (8 mg total) by mouth 3 (three) times daily as needed (Nausea). 10 tablet 0    semaglutide (OZEMPIC) 1 mg/dose (4 mg/3 mL) Inject 1 mg into the skin every 7 days. 3 mL 0    [DISCONTINUED] semaglutide (OZEMPIC) 1 mg/dose (4 mg/3 mL) Inject 1 mg into the skin every 7 days. 3 mL 0     Vitals:    12/10/24 0806   BP: 107/74   Pulse: 81   Weight: 65.5 kg (144 lb 6.4 oz)   Height: 5' 4" (1.626 m)     Past Medical History:   Diagnosis Date    Back pain      Past Surgical History:   Procedure Laterality Date    BREAST " SURGERY      Breast reduction     SECTION      x3    REDUCTION OF BOTH BREASTS Bilateral 2023    RIGHT OOPHORECTOMY      SALPINGECTOMY Bilateral     TOTAL ABDOMINAL HYSTERECTOMY      With BS    TUBAL LIGATION  Dec 15,2006       GYN & OB History  No LMP recorded. Patient has had a hysterectomy.   Date of Last Pap: 2023    OB History    Para Term  AB Living   3 3 3     3   SAB IAB Ectopic Multiple Live Births           3      # Outcome Date GA Lbr Surjit/2nd Weight Sex Type Anes PTL Lv   3 Term      CS-Unspec   GINA   2 Term      CS-Unspec   GINA   1 Term      CS-Unspec   GINA       Review of Systems  Review of Systems   Constitutional:  Negative for activity change, appetite change, chills, fatigue and fever.   HENT:  Negative for nasal congestion and tinnitus.    Eyes:  Negative for visual disturbance.   Respiratory:  Negative for cough and shortness of breath.    Cardiovascular:  Negative for chest pain and palpitations.   Gastrointestinal:  Positive for constipation. Negative for abdominal pain, bloating, blood in stool, nausea and vomiting.   Endocrine: Negative for diabetes, hair loss and hot flashes.   Genitourinary:  Negative for bladder incontinence, decreased libido, dysmenorrhea, dyspareunia, dysuria, flank pain, frequency, genital sores, hematuria, hot flashes, menorrhagia, menstrual problem, pelvic pain, urgency, vaginal bleeding, vaginal discharge, vaginal pain, urinary incontinence, postcoital bleeding, postmenopausal bleeding, vaginal dryness and vaginal odor.   Musculoskeletal:  Positive for back pain. Negative for arthralgias, leg pain and myalgias.   Integumentary:  Negative for rash, acne, hair changes, mole/lesion, breast mass, nipple discharge, breast skin changes and breast tenderness.   Neurological:  Negative for vertigo, syncope, numbness and headaches.   Hematological:  Does not bruise/bleed easily.   Psychiatric/Behavioral:  Negative for depression and sleep  disturbance. The patient is not nervous/anxious.    Breast: Negative for asymmetry, lump, mass, mastodynia, nipple discharge, skin changes and tenderness         Objective   Physical Exam:   Constitutional: She is oriented to person, place, and time. She appears well-developed and well-nourished. She is cooperative.    HENT:   Nose: No epistaxis.      Cardiovascular:  Normal rate, regular rhythm and normal heart sounds.             Pulmonary/Chest: Effort normal and breath sounds normal. No respiratory distress.        Abdominal: Soft and flat. Bowel sounds are normal.             Musculoskeletal: Normal range of motion and moves all extremeties.      Lymphadenopathy:     She has no cervical adenopathy.    Neurological: She is alert and oriented to person, place, and time.    Skin: Skin is warm and dry.    Psychiatric: Her speech is normal and behavior is normal. Mood, memory, affect, judgment and thought content normal.            Assessment and Plan     1. Overweight (BMI 25.0-29.9)    2. Acute midline low back pain without sciatica    3. Colon cancer screening             Plan:  Overweight (BMI 25.0-29.9)  -     semaglutide (OZEMPIC) 1 mg/dose (4 mg/3 mL); Inject 1 mg into the skin every 7 days.  Dispense: 3 mL; Refill: 0  Discussed with the patience the importance of exercising at least three to four days a week, 30-45 minutes a session. Discussed options such as biking, walking, running, and swimming. Discussed increasing fruits and vegetables in diet and decreasing sodium and processed foods. Decrease carbonated beverages.     Acute midline low back pain without sciatica  -     Ambulatory referral/consult to Neurology; Future; Expected date: 12/17/2024  Possible imaging     Colon cancer screening  -     Ambulatory referral/consult to Gastroenterology; Future; Expected date: 12/17/2024       Follow up if symptoms worsen or fail to improve.

## 2024-12-17 ENCOUNTER — PATIENT MESSAGE (OUTPATIENT)
Dept: PRIMARY CARE CLINIC | Facility: CLINIC | Age: 49
End: 2024-12-17
Payer: COMMERCIAL

## 2024-12-17 ENCOUNTER — PATIENT MESSAGE (OUTPATIENT)
Dept: OBSTETRICS AND GYNECOLOGY | Facility: CLINIC | Age: 49
End: 2024-12-17
Payer: COMMERCIAL

## 2025-01-09 ENCOUNTER — PATIENT MESSAGE (OUTPATIENT)
Dept: PRIMARY CARE CLINIC | Facility: CLINIC | Age: 50
End: 2025-01-09
Payer: COMMERCIAL

## 2025-01-11 ENCOUNTER — TELEPHONE (OUTPATIENT)
Dept: GASTROENTEROLOGY | Facility: CLINIC | Age: 50
End: 2025-01-11
Payer: COMMERCIAL

## 2025-01-16 ENCOUNTER — PATIENT MESSAGE (OUTPATIENT)
Dept: OBSTETRICS AND GYNECOLOGY | Facility: CLINIC | Age: 50
End: 2025-01-16
Payer: COMMERCIAL

## 2025-01-17 DIAGNOSIS — E66.3 OVERWEIGHT (BMI 25.0-29.9): ICD-10-CM

## 2025-01-17 RX ORDER — SEMAGLUTIDE 1.34 MG/ML
1 INJECTION, SOLUTION SUBCUTANEOUS
Qty: 5 ML | Refills: 0 | Status: SHIPPED | OUTPATIENT
Start: 2025-01-17

## 2025-01-28 ENCOUNTER — TELEPHONE (OUTPATIENT)
Dept: GASTROENTEROLOGY | Facility: CLINIC | Age: 50
End: 2025-01-28
Payer: COMMERCIAL

## 2025-01-28 VITALS — WEIGHT: 137 LBS | BODY MASS INDEX: 23.39 KG/M2 | HEIGHT: 64 IN

## 2025-01-28 DIAGNOSIS — Z12.11 SCREENING FOR COLON CANCER: Primary | ICD-10-CM

## 2025-01-28 RX ORDER — PROCHLORPERAZINE MALEATE 10 MG
10 TABLET ORAL 2 TIMES DAILY PRN
COMMUNITY
Start: 2025-01-10

## 2025-01-28 RX ORDER — KETOROLAC TROMETHAMINE 10 MG/1
10 TABLET, FILM COATED ORAL EVERY 6 HOURS PRN
COMMUNITY
Start: 2025-01-10

## 2025-01-28 NOTE — TELEPHONE ENCOUNTER
Message  Received: 1 week ago   Appointment Access  Lary Lora Staff  Caller: CARLY SALAZAR [[id number]] (1 week ago)  ...Type:  Patient Requesting Appointment    Who Called:CARLY SALAZAR [[id number]]  Symptoms?: needs to schedule colonoscopy  Would the patient rather a call back or a response via [facility]?  call  Best Call Back Number: [phone] (home)  Additional Information:

## 2025-01-28 NOTE — TELEPHONE ENCOUNTER
Spoke with patient to direct schedule colonoscopy. Patient denied ever having a traditional colonoscopy but stated that she had a NEGATIVE cologuard 3 years ago in [place] with her PCP Dr. Carmel Alberto ([phone])     Chart was reviewed and updated with patient while on the phone.    Patient denied history of kidney disease, seizure disorder or sleep apnea and she is not on any blood thinners. Patient is taking OZEMPIC and was told to HOLD medication for 1 week prior to procedure.     Prep that will be sent is SUTAB    Prep instructions were reviewed with patient and sent to patient at   [email or web address]     Colonoscopy is scheduled Thursday June 19, 2025 at [facility] with NBP.     Patient verbalized understanding of this information. -CRISELDA,LPN

## 2025-02-03 RX ORDER — SOD SULF/POT CHLORIDE/MAG SULF 1.479 G
TABLET ORAL
Qty: 24 TABLET | Refills: 0 | Status: SHIPPED | OUTPATIENT
Start: 2025-02-03

## 2025-04-01 ENCOUNTER — PATIENT MESSAGE (OUTPATIENT)
Dept: PRIMARY CARE CLINIC | Facility: CLINIC | Age: 50
End: 2025-04-01
Payer: COMMERCIAL

## 2025-04-24 ENCOUNTER — PATIENT MESSAGE (OUTPATIENT)
Dept: PRIMARY CARE CLINIC | Facility: CLINIC | Age: 50
End: 2025-04-24
Payer: COMMERCIAL

## 2025-04-24 DIAGNOSIS — Z13.29 THYROID DISORDER SCREEN: ICD-10-CM

## 2025-04-24 DIAGNOSIS — Z13.1 DIABETES MELLITUS SCREENING: ICD-10-CM

## 2025-04-24 DIAGNOSIS — Z13.220 SCREENING CHOLESTEROL LEVEL: ICD-10-CM

## 2025-04-24 DIAGNOSIS — Z11.59 NEED FOR HEPATITIS C SCREENING TEST: ICD-10-CM

## 2025-04-24 DIAGNOSIS — Z00.00 ANNUAL PHYSICAL EXAM: Primary | ICD-10-CM

## 2025-04-24 DIAGNOSIS — Z11.4 SCREENING FOR HIV (HUMAN IMMUNODEFICIENCY VIRUS): ICD-10-CM

## 2025-04-24 DIAGNOSIS — R35.0 URINE FREQUENCY: ICD-10-CM

## 2025-04-25 NOTE — TELEPHONE ENCOUNTER
Please let patient know I have ordered her annual labs so she can come by our lab to have them drawn but make sure to fast after midnight the night before she comes to have her labs done. Thanks.

## 2025-05-30 ENCOUNTER — PATIENT MESSAGE (OUTPATIENT)
Dept: PRIMARY CARE CLINIC | Facility: CLINIC | Age: 50
End: 2025-05-30
Payer: COMMERCIAL

## 2025-06-02 RX ORDER — DIAZEPAM 10 MG/1
TABLET ORAL
COMMUNITY
Start: 2025-01-28 | End: 2025-06-04

## 2025-06-04 ENCOUNTER — OFFICE VISIT (OUTPATIENT)
Dept: PRIMARY CARE CLINIC | Facility: CLINIC | Age: 50
End: 2025-06-04
Payer: COMMERCIAL

## 2025-06-04 VITALS
BODY MASS INDEX: 25.57 KG/M2 | WEIGHT: 149.81 LBS | SYSTOLIC BLOOD PRESSURE: 106 MMHG | DIASTOLIC BLOOD PRESSURE: 52 MMHG | OXYGEN SATURATION: 97 % | HEIGHT: 64 IN | RESPIRATION RATE: 18 BRPM | HEART RATE: 87 BPM

## 2025-06-04 DIAGNOSIS — Z00.00 ANNUAL PHYSICAL EXAM: Primary | ICD-10-CM

## 2025-06-04 DIAGNOSIS — Z12.31 BREAST CANCER SCREENING BY MAMMOGRAM: ICD-10-CM

## 2025-06-04 DIAGNOSIS — N30.00 ACUTE CYSTITIS WITHOUT HEMATURIA: ICD-10-CM

## 2025-06-04 PROCEDURE — 1160F RVW MEDS BY RX/DR IN RCRD: CPT | Mod: CPTII,,, | Performed by: NURSE PRACTITIONER

## 2025-06-04 PROCEDURE — 3074F SYST BP LT 130 MM HG: CPT | Mod: CPTII,,, | Performed by: NURSE PRACTITIONER

## 2025-06-04 PROCEDURE — 1159F MED LIST DOCD IN RCRD: CPT | Mod: CPTII,,, | Performed by: NURSE PRACTITIONER

## 2025-06-04 PROCEDURE — 3044F HG A1C LEVEL LT 7.0%: CPT | Mod: CPTII,,, | Performed by: NURSE PRACTITIONER

## 2025-06-04 PROCEDURE — 3078F DIAST BP <80 MM HG: CPT | Mod: CPTII,,, | Performed by: NURSE PRACTITIONER

## 2025-06-04 PROCEDURE — 3008F BODY MASS INDEX DOCD: CPT | Mod: CPTII,,, | Performed by: NURSE PRACTITIONER

## 2025-06-04 PROCEDURE — 99396 PREV VISIT EST AGE 40-64: CPT | Mod: S$PBB,,, | Performed by: NURSE PRACTITIONER

## 2025-06-04 RX ORDER — NITROFURANTOIN 25; 75 MG/1; MG/1
100 CAPSULE ORAL 2 TIMES DAILY
Qty: 10 CAPSULE | Refills: 0 | Status: SHIPPED | OUTPATIENT
Start: 2025-06-04 | End: 2025-06-09

## 2025-06-10 ENCOUNTER — OFFICE VISIT (OUTPATIENT)
Dept: OBSTETRICS AND GYNECOLOGY | Facility: CLINIC | Age: 50
End: 2025-06-10
Payer: COMMERCIAL

## 2025-06-10 VITALS
BODY MASS INDEX: 25.61 KG/M2 | HEIGHT: 64 IN | DIASTOLIC BLOOD PRESSURE: 72 MMHG | HEART RATE: 75 BPM | SYSTOLIC BLOOD PRESSURE: 110 MMHG | WEIGHT: 150 LBS

## 2025-06-10 DIAGNOSIS — E66.3 OVERWEIGHT (BMI 25.0-29.9): Primary | ICD-10-CM

## 2025-06-10 PROCEDURE — 99213 OFFICE O/P EST LOW 20 MIN: CPT | Mod: S$PBB,,, | Performed by: NURSE PRACTITIONER

## 2025-06-10 PROCEDURE — 3008F BODY MASS INDEX DOCD: CPT | Mod: CPTII,,, | Performed by: NURSE PRACTITIONER

## 2025-06-10 PROCEDURE — 1160F RVW MEDS BY RX/DR IN RCRD: CPT | Mod: CPTII,,, | Performed by: NURSE PRACTITIONER

## 2025-06-10 PROCEDURE — 3078F DIAST BP <80 MM HG: CPT | Mod: CPTII,,, | Performed by: NURSE PRACTITIONER

## 2025-06-10 PROCEDURE — 3074F SYST BP LT 130 MM HG: CPT | Mod: CPTII,,, | Performed by: NURSE PRACTITIONER

## 2025-06-10 PROCEDURE — 3044F HG A1C LEVEL LT 7.0%: CPT | Mod: CPTII,,, | Performed by: NURSE PRACTITIONER

## 2025-06-10 PROCEDURE — 1159F MED LIST DOCD IN RCRD: CPT | Mod: CPTII,,, | Performed by: NURSE PRACTITIONER

## 2025-06-10 RX ORDER — SEMAGLUTIDE 1.34 MG/ML
INJECTION, SOLUTION SUBCUTANEOUS
Qty: 5 ML | Refills: 0 | Status: SHIPPED | OUTPATIENT
Start: 2025-06-10

## 2025-06-10 NOTE — PROGRESS NOTES
"Subjective     Patient ID: Layla Matias is a 49 y.o. female.    Chief Complaint:  Refill Semaglutide      History of Present Illness  HPI  Patient presents to the clinic for weight loss injection refill.  She reports that she is doing well.  She denies abdominal pain, constipation, diarrhea, nausea, vomiting.    Outpatient Medications Marked as Taking for the 6/10/25 encounter (Office Visit) with Valeria Fuentes, NP   Medication Sig Dispense Refill    calcium-vitamin D3 (OS-RICHELLE 500 + D3) 500 mg-5 mcg (200 unit) per tablet Take 1 tablet by mouth 2 (two) times daily with meals.      hydrocortisone 2.5 % cream Apply topically 2 (two) times daily. Please add lidocaine 5%-aka rectal relief cream 30 g 1    multivitamin capsule Take 1 capsule by mouth once daily.      nitrofurantoin, macrocrystal-monohydrate, (MACROBID) 100 MG capsule Take 1 capsule (100 mg total) by mouth 2 (two) times daily. for 5 days 10 capsule 0    [DISCONTINUED] semaglutide (OZEMPIC) 1 mg/dose (4 mg/3 mL) Inject 1 mg into the skin every 7 days. 5 mL 0     Vitals:    06/10/25 0813   BP: 110/72   Pulse: 75   Weight: 68 kg (150 lb)   Height: 5' 4" (1.626 m)     Past Medical History:   Diagnosis Date    Back pain     BMI 23.0-23.9, adult     BMI 23.52 as of 2025     Past Surgical History:   Procedure Laterality Date    BREAST SURGERY      Breast reduction     SECTION      x3    KNEE LIGAMENT RECONSTRUCTION Right     REDUCTION OF BOTH BREASTS Bilateral 2023    RIGHT OOPHORECTOMY      SALPINGECTOMY Bilateral     TOTAL ABDOMINAL HYSTERECTOMY      With BS    TUBAL LIGATION  Dec 15,2006       GYN & OB History  No LMP recorded. Patient has had a hysterectomy.   Date of Last Pap: 2023    OB History    Para Term  AB Living   3 3 3   3   SAB IAB Ectopic Multiple Live Births       3      # Outcome Date GA Lbr Surjit/2nd Weight Sex Type Anes PTL Lv   3 Term      CS-Unspec   GINA   2 Term      CS-Unspec   GINA   1 Term      " CS-Unspec   GINA       Review of Systems  Review of Systems   Constitutional:  Negative for activity change, appetite change, chills, fatigue and fever.   HENT:  Negative for nasal congestion and tinnitus.    Eyes:  Negative for visual disturbance.   Respiratory:  Negative for cough and shortness of breath.    Cardiovascular:  Negative for chest pain and palpitations.   Gastrointestinal:  Negative for abdominal pain, bloating, blood in stool, constipation, nausea and vomiting.   Endocrine: Negative for diabetes, hair loss and hot flashes.   Genitourinary:  Negative for bladder incontinence, decreased libido, dysmenorrhea, dyspareunia, dysuria, flank pain, frequency, genital sores, hematuria, hot flashes, menorrhagia, menstrual problem, pelvic pain, urgency, vaginal bleeding, vaginal discharge, vaginal pain, urinary incontinence, postcoital bleeding, postmenopausal bleeding, vaginal dryness and vaginal odor.   Musculoskeletal:  Negative for arthralgias, back pain, leg pain and myalgias.   Integumentary:  Negative for rash, acne, hair changes, mole/lesion, breast mass, nipple discharge, breast skin changes and breast tenderness.   Neurological:  Negative for vertigo, syncope, numbness and headaches.   Hematological:  Does not bruise/bleed easily.   Psychiatric/Behavioral:  Negative for depression and sleep disturbance. The patient is not nervous/anxious.    Breast: Negative for asymmetry, lump, mass, mastodynia, nipple discharge, skin changes and tenderness         Objective   Physical Exam:   Constitutional: She is oriented to person, place, and time. She appears well-developed and well-nourished. She is cooperative.    HENT:   Nose: No epistaxis.      Cardiovascular:  Normal rate, regular rhythm and normal heart sounds.             Pulmonary/Chest: Effort normal and breath sounds normal. No respiratory distress. Right breast exhibits no inverted nipple, no mass, no nipple discharge, no skin change, no tenderness, no  bleeding and no swelling. Left breast exhibits no inverted nipple, no mass, no nipple discharge, no skin change, no tenderness, no bleeding and no swelling.        Abdominal: Soft and flat. Bowel sounds are normal.             Musculoskeletal: Normal range of motion and moves all extremeties.      Lymphadenopathy:     She has no cervical adenopathy.    Neurological: She is alert and oriented to person, place, and time.    Skin: Skin is warm and dry.    Psychiatric: Her speech is normal and behavior is normal. Mood, memory, affect, judgment and thought content normal.            Assessment and Plan     1. Overweight (BMI 25.0-29.9)             Plan:  Overweight (BMI 25.0-29.9)  -     semaglutide (OZEMPIC) 1 mg/dose (4 mg/3 mL); Please compound 4 mg/5mg glycine per ml .As the prescriber, I am prescribing the compounded formulation of this medication as written here in, because it will produce a clinically significant difference and improvement for my patient as compared to the commercially available product. The compounded version of this product being prescribed is medically necessary to address the specific needs of this patient which relieves a non standard, non commercially available version of this product as prescribed here in, and includes glycine to address muscle waste making this modification accentual for their treatment. Patient is to inject 5 mg subcutaneously every 7 days  Dispense: 5 mL; Refill: 0      Discussed with the patience the importance of exercising at least three to four days a week, 30-45 minutes a session. Discussed options such as biking, walking, running, and swimming. Discussed increasing fruits and vegetables in diet and decreasing sodium and processed foods. Decrease carbonated beverages.     Follow up in about 3 months (around 9/10/2025).

## 2025-06-12 ENCOUNTER — TELEPHONE (OUTPATIENT)
Dept: OBSTETRICS AND GYNECOLOGY | Facility: CLINIC | Age: 50
End: 2025-06-12
Payer: COMMERCIAL

## 2025-06-12 ENCOUNTER — TELEPHONE (OUTPATIENT)
Dept: GASTROENTEROLOGY | Facility: CLINIC | Age: 50
End: 2025-06-12
Payer: COMMERCIAL

## 2025-06-12 DIAGNOSIS — Z12.11 SCREENING FOR COLON CANCER: Primary | ICD-10-CM

## 2025-06-12 NOTE — TELEPHONE ENCOUNTER
"  [facility]      Provider: Kelly Salvador MD    Patient Name: Layla HANEY (age):1975  49 y.o.           Gender: female   Phone: [phone]     Referring Physician: Isabel Bradshaw     Vital Signs:   Height - 5' 4"  Weight - 150 lb    Plan: Colonoscopy     Encounter Diagnosis   Name Primary?    Screening for colon cancer Yes         History:      Past Medical History:   Diagnosis Date    Back pain     BMI 23.0-23.9, adult     BMI 23.52 as of 2025      Past Surgical History:   Procedure Laterality Date    BREAST SURGERY      Breast reduction     SECTION      x3    KNEE LIGAMENT RECONSTRUCTION Right     REDUCTION OF BOTH BREASTS Bilateral 2023    RIGHT OOPHORECTOMY      SALPINGECTOMY Bilateral     TOTAL ABDOMINAL HYSTERECTOMY      With BS    TUBAL LIGATION  Dec 15,2006      Medication List with Changes/Refills   Current Medications    CALCIUM-VITAMIN D3 (OS-RICHELLE 500 + D3) 500 MG-5 MCG (200 UNIT) PER TABLET    Take 1 tablet by mouth 2 (two) times daily with meals.    HYDROCORTISONE 2.5 % CREAM    Apply topically 2 (two) times daily. Please add lidocaine 5%-aka rectal relief cream    MULTIVITAMIN CAPSULE    Take 1 capsule by mouth once daily.    SEMAGLUTIDE (OZEMPIC) 1 MG/DOSE (4 MG/3 ML)    Please compound 4 mg/5mg glycine per ml .As the prescriber, I am prescribing the compounded formulation of this medication as written here in, because it will produce a clinically significant difference and improvement for my patient as compared to the commercially available product. The compounded version of this product being prescribed is medically necessary to address the specific needs of this patient which relieves a non standard, non commercially available version of this product as prescribed here in, and includes glycine to address muscle waste making this modification accentual for their treatment. " Patient is to inject 5 mg subcutaneously every 7 days    SOD SULF-POT CHLORIDE-MAG SULF (SUTAB) 1.479-0.188- 0.225 GRAM TABLET    Take according to package instructions with indicated amount of water. No breakfast day before test. May substitute with Suprep, Clenpiq, Plenvu, Moviprep or GoLytely based on Rx plan and patient preference.      Review of patient's allergies indicates:   Allergen Reactions    Codeine Other (See Comments)     Severe vomiting    Sulfa (sulfonamide antibiotics) Hives      Family History   Problem Relation Name Age of Onset    Uterine cancer Mother          ukn age    Breast cancer Neg Hx      Colon cancer Neg Hx      Ovarian cancer Neg Hx      Melanoma Neg Hx      Pancreatic cancer Neg Hx      Prostate cancer Neg Hx      Ulcerative colitis Neg Hx      Liver disease Neg Hx      Hereditary non-polyposis colon cancer Neg Hx      Throat cancer Neg Hx      Esophageal cancer Neg Hx      Crohn's disease Neg Hx      Stomach cancer Neg Hx        Social History[1]        [1]   Social History  Tobacco Use    Smoking status: Never    Smokeless tobacco: Never   Substance Use Topics    Alcohol use: Yes     Comment: Occasional    Drug use: Never

## 2025-06-12 NOTE — TELEPHONE ENCOUNTER
Called and spoke with Deysi at [facility]. I adv her Valeria had spoke with someone yesterday in regards to patient's prescription. She stated there is no documentation but the 5mg is too high of dose for the medication being prescribed. Adv patient is continuing her same dose from the last refill. She is aware and verbalized understanding.   Zara Bustamante

## 2025-06-12 NOTE — TELEPHONE ENCOUNTER
S/w pt and told her that I was calling as a courtesy regarding up coming EGD/Colon with NBP on 6/19/25, Thurs and wanted to verify that she has her paper prep instructions and meds. Pt stated she needs to r/s her procedure since her daughter will be coming into town. R/s to 7/18/25 -Friday. Updated Epic and re-faxed order. nicole

## 2025-06-12 NOTE — TELEPHONE ENCOUNTER
Copied from CRM #[id number]. Topic: Medications - Pharmacy  >> Jun 12, 2025 12:31 PM Nu wrote:  Type:  Pharmacy Calling to Clarify an RX    Name of Caller:[facility]  [facility] Name:[facility]  Prescription Name:semaglutide (OZEMPIC) 1 mg/dose (4 mg/3 mL)  What do they need to clarify?:strength  Best Call Back Number:[phone]  Additional Information: n/a

## 2025-06-12 NOTE — TELEPHONE ENCOUNTER
Copied from CRM #[id number]. Topic: Medications - Medication Question  >> Jun 12, 2025 11:06 AM Nikki wrote:  Type:  Needs Medical Advice    Who Called: [facility]   Symptoms (please be specific): -   How long has patient had these symptoms:  GT   Pharmacy name and phone #:  -    Would the patient rather a call back or a response via [facility]?   Best Call Back Number: [phone]  Additional Information: has questions about pt's semaglutide (OZEMPIC) 1 mg/dose (4 mg/3 mL) script please call

## 2025-07-11 ENCOUNTER — TELEPHONE (OUTPATIENT)
Dept: GASTROENTEROLOGY | Facility: CLINIC | Age: 50
End: 2025-07-11
Payer: COMMERCIAL

## 2025-07-11 DIAGNOSIS — Z12.11 SCREENING FOR COLON CANCER: Primary | ICD-10-CM

## 2025-07-11 NOTE — TELEPHONE ENCOUNTER
"  [facility]      Provider: Kelly Salvador MD    Patient Name: Layla HANEY (age):1975  49 y.o.           Gender: female   Phone: [phone]     Referring Physician: Isabel Bradshaw     Vital Signs:   Height - 5' 4"  Weight - 150 lb    Plan: Colonoscopy     Encounter Diagnosis   Name Primary?    Screening for colon cancer Yes         History:      Past Medical History:   Diagnosis Date    Back pain     BMI 23.0-23.9, adult     BMI 23.52 as of 2025      Past Surgical History:   Procedure Laterality Date    BREAST SURGERY      Breast reduction     SECTION      x3    KNEE LIGAMENT RECONSTRUCTION Right     REDUCTION OF BOTH BREASTS Bilateral 2023    RIGHT OOPHORECTOMY      SALPINGECTOMY Bilateral     TOTAL ABDOMINAL HYSTERECTOMY      With BS    TUBAL LIGATION  Dec 15,2006      Medication List with Changes/Refills   Current Medications    CALCIUM-VITAMIN D3 (OS-RICHELLE 500 + D3) 500 MG-5 MCG (200 UNIT) PER TABLET    Take 1 tablet by mouth 2 (two) times daily with meals.    HYDROCORTISONE 2.5 % CREAM    Apply topically 2 (two) times daily. Please add lidocaine 5%-aka rectal relief cream    MULTIVITAMIN CAPSULE    Take 1 capsule by mouth once daily.    SEMAGLUTIDE (OZEMPIC) 1 MG/DOSE (4 MG/3 ML)    Please compound 4 mg/5mg glycine per ml .As the prescriber, I am prescribing the compounded formulation of this medication as written here in, because it will produce a clinically significant difference and improvement for my patient as compared to the commercially available product. The compounded version of this product being prescribed is medically necessary to address the specific needs of this patient which relieves a non standard, non commercially available version of this product as prescribed here in, and includes glycine to address muscle waste making this modification accentual for their treatment. " Patient is to inject 5 mg subcutaneously every 7 days    SOD SULF-POT CHLORIDE-MAG SULF (SUTAB) 1.479-0.188- 0.225 GRAM TABLET    Take according to package instructions with indicated amount of water. No breakfast day before test. May substitute with Suprep, Clenpiq, Plenvu, Moviprep or GoLytely based on Rx plan and patient preference.      Review of patient's allergies indicates:   Allergen Reactions    Codeine Other (See Comments)     Severe vomiting    Sulfa (sulfonamide antibiotics) Hives      Family History   Problem Relation Name Age of Onset    Uterine cancer Mother          ukn age    Breast cancer Neg Hx      Colon cancer Neg Hx      Ovarian cancer Neg Hx      Melanoma Neg Hx      Pancreatic cancer Neg Hx      Prostate cancer Neg Hx      Ulcerative colitis Neg Hx      Liver disease Neg Hx      Hereditary non-polyposis colon cancer Neg Hx      Throat cancer Neg Hx      Esophageal cancer Neg Hx      Crohn's disease Neg Hx      Stomach cancer Neg Hx        Social History[1]        [1]   Social History  Tobacco Use    Smoking status: Never    Smokeless tobacco: Never   Substance Use Topics    Alcohol use: Yes     Comment: Occasional    Drug use: Never

## 2025-07-11 NOTE — TELEPHONE ENCOUNTER
Called pt and left a detailed message that I was calling as a courtesy regarding up coming EGD/Colon with [facility] on 7/18/25, Fri and wanted to verify that she has her paper prep instructions and meds. I reminded pt not to take Ozempic 7 days before the procedure. I also mentioned that [facility] will call the day before (THURS) with the arrival time, GI Lab is located on the third floor, and to pre-register before next Friday. nicole

## 2025-07-18 ENCOUNTER — OUTSIDE PLACE OF SERVICE (OUTPATIENT)
Dept: GASTROENTEROLOGY | Facility: CLINIC | Age: 50
End: 2025-07-18

## 2025-07-18 LAB — CRC RECOMMENDATION EXT: NORMAL

## 2025-07-23 ENCOUNTER — PATIENT OUTREACH (OUTPATIENT)
Dept: ADMINISTRATIVE | Facility: HOSPITAL | Age: 50
End: 2025-07-23
Payer: COMMERCIAL

## 2025-07-23 NOTE — PROGRESS NOTES
Does Not Meet Criteria for Program  [facility] Care Coordination Program  [facility] VBC Auto Enrollment Filter Rule

## 2025-07-28 ENCOUNTER — RESULTS FOLLOW-UP (OUTPATIENT)
Dept: GASTROENTEROLOGY | Facility: CLINIC | Age: 50
End: 2025-07-28
Payer: COMMERCIAL

## 2025-07-29 NOTE — TELEPHONE ENCOUNTER
1 SA, repeat colonoscopy in 5 years.     Notify patient. Confirm recall tab in appointment desk is up-to-date (update if needed).  [facility]

## 2025-07-31 ENCOUNTER — PATIENT MESSAGE (OUTPATIENT)
Dept: PRIMARY CARE CLINIC | Facility: CLINIC | Age: 50
End: 2025-07-31
Payer: COMMERCIAL